# Patient Record
Sex: MALE
[De-identification: names, ages, dates, MRNs, and addresses within clinical notes are randomized per-mention and may not be internally consistent; named-entity substitution may affect disease eponyms.]

---

## 2022-08-28 ENCOUNTER — HOSPITAL ENCOUNTER (INPATIENT)
Dept: HOSPITAL 97 - ER | Age: 67
LOS: 6 days | Discharge: NURSING, REHAB OR LONG-TERM CARE | DRG: 65 | End: 2022-09-03
Attending: INTERNAL MEDICINE | Admitting: INTERNAL MEDICINE
Payer: COMMERCIAL

## 2022-08-28 VITALS — BODY MASS INDEX: 26.6 KG/M2

## 2022-08-28 DIAGNOSIS — I63.232: Primary | ICD-10-CM

## 2022-08-28 DIAGNOSIS — N17.9: ICD-10-CM

## 2022-08-28 DIAGNOSIS — F17.210: ICD-10-CM

## 2022-08-28 DIAGNOSIS — Z79.02: ICD-10-CM

## 2022-08-28 DIAGNOSIS — Z79.82: ICD-10-CM

## 2022-08-28 DIAGNOSIS — R29.704: ICD-10-CM

## 2022-08-28 DIAGNOSIS — D69.6: ICD-10-CM

## 2022-08-28 DIAGNOSIS — Z86.73: ICD-10-CM

## 2022-08-28 DIAGNOSIS — I12.9: ICD-10-CM

## 2022-08-28 DIAGNOSIS — Z79.84: ICD-10-CM

## 2022-08-28 DIAGNOSIS — Z20.822: ICD-10-CM

## 2022-08-28 DIAGNOSIS — E78.5: ICD-10-CM

## 2022-08-28 DIAGNOSIS — N50.9: ICD-10-CM

## 2022-08-28 DIAGNOSIS — Z79.899: ICD-10-CM

## 2022-08-28 DIAGNOSIS — R31.29: ICD-10-CM

## 2022-08-28 DIAGNOSIS — Z23: ICD-10-CM

## 2022-08-28 DIAGNOSIS — C62.11: ICD-10-CM

## 2022-08-28 DIAGNOSIS — G81.94: ICD-10-CM

## 2022-08-28 DIAGNOSIS — N18.30: ICD-10-CM

## 2022-08-28 DIAGNOSIS — E11.22: ICD-10-CM

## 2022-08-28 DIAGNOSIS — I16.0: ICD-10-CM

## 2022-08-28 LAB
BUN BLD-MCNC: 37 MG/DL (ref 7–18)
GLUCOSE SERPLBLD-MCNC: 137 MG/DL (ref 74–106)
HCT VFR BLD CALC: 37.2 % (ref 39.6–49)
INR BLD: 1.07
LYMPHOCYTES # SPEC AUTO: 2.1 K/UL (ref 0.7–4.9)
MAGNESIUM SERPL-MCNC: 2 MG/DL (ref 1.8–2.4)
MCV RBC: 83.1 FL (ref 80–100)
PMV BLD: 8 FL (ref 7.6–11.3)
POTASSIUM SERPL-SCNC: 4.9 MMOL/L (ref 3.5–5.1)
RBC # BLD: 4.47 M/UL (ref 4.33–5.43)

## 2022-08-28 PROCEDURE — 80061 LIPID PANEL: CPT

## 2022-08-28 PROCEDURE — 97165 OT EVAL LOW COMPLEX 30 MIN: CPT

## 2022-08-28 PROCEDURE — 87811 SARS-COV-2 COVID19 W/OPTIC: CPT

## 2022-08-28 PROCEDURE — 83735 ASSAY OF MAGNESIUM: CPT

## 2022-08-28 PROCEDURE — 71045 X-RAY EXAM CHEST 1 VIEW: CPT

## 2022-08-28 PROCEDURE — 87070 CULTURE OTHR SPECIMN AEROBIC: CPT

## 2022-08-28 PROCEDURE — 97530 THERAPEUTIC ACTIVITIES: CPT

## 2022-08-28 PROCEDURE — 70544 MR ANGIOGRAPHY HEAD W/O DYE: CPT

## 2022-08-28 PROCEDURE — 86022 PLATELET ANTIBODIES: CPT

## 2022-08-28 PROCEDURE — 84443 ASSAY THYROID STIM HORMONE: CPT

## 2022-08-28 PROCEDURE — 83605 ASSAY OF LACTIC ACID: CPT

## 2022-08-28 PROCEDURE — 96374 THER/PROPH/DIAG INJ IV PUSH: CPT

## 2022-08-28 PROCEDURE — 85610 PROTHROMBIN TIME: CPT

## 2022-08-28 PROCEDURE — 97116 GAIT TRAINING THERAPY: CPT

## 2022-08-28 PROCEDURE — 90471 IMMUNIZATION ADMIN: CPT

## 2022-08-28 PROCEDURE — 84702 CHORIONIC GONADOTROPIN TEST: CPT

## 2022-08-28 PROCEDURE — 97161 PT EVAL LOW COMPLEX 20 MIN: CPT

## 2022-08-28 PROCEDURE — 70553 MRI BRAIN STEM W/O & W/DYE: CPT

## 2022-08-28 PROCEDURE — 93005 ELECTROCARDIOGRAM TRACING: CPT

## 2022-08-28 PROCEDURE — 99284 EMERGENCY DEPT VISIT MOD MDM: CPT

## 2022-08-28 PROCEDURE — 83036 HEMOGLOBIN GLYCOSYLATED A1C: CPT

## 2022-08-28 PROCEDURE — 82553 CREATINE MB FRACTION: CPT

## 2022-08-28 PROCEDURE — 93880 EXTRACRANIAL BILAT STUDY: CPT

## 2022-08-28 PROCEDURE — 72192 CT PELVIS W/O DYE: CPT

## 2022-08-28 PROCEDURE — 80053 COMPREHEN METABOLIC PANEL: CPT

## 2022-08-28 PROCEDURE — 80048 BASIC METABOLIC PNL TOTAL CA: CPT

## 2022-08-28 PROCEDURE — 93306 TTE W/DOPPLER COMPLETE: CPT

## 2022-08-28 PROCEDURE — 82947 ASSAY GLUCOSE BLOOD QUANT: CPT

## 2022-08-28 PROCEDURE — 83615 LACTATE (LD) (LDH) ENZYME: CPT

## 2022-08-28 PROCEDURE — 70549 MR ANGIOGRAPH NECK W/O&W/DYE: CPT

## 2022-08-28 PROCEDURE — 81001 URINALYSIS AUTO W/SCOPE: CPT

## 2022-08-28 PROCEDURE — 90732 PPSV23 VACC 2 YRS+ SUBQ/IM: CPT

## 2022-08-28 PROCEDURE — 87081 CULTURE SCREEN ONLY: CPT

## 2022-08-28 PROCEDURE — 92610 EVALUATE SWALLOWING FUNCTION: CPT

## 2022-08-28 PROCEDURE — 74177 CT ABD & PELVIS W/CONTRAST: CPT

## 2022-08-28 PROCEDURE — 87040 BLOOD CULTURE FOR BACTERIA: CPT

## 2022-08-28 PROCEDURE — 36415 COLL VENOUS BLD VENIPUNCTURE: CPT

## 2022-08-28 PROCEDURE — 70450 CT HEAD/BRAIN W/O DYE: CPT

## 2022-08-28 PROCEDURE — 85730 THROMBOPLASTIN TIME PARTIAL: CPT

## 2022-08-28 PROCEDURE — 93970 EXTREMITY STUDY: CPT

## 2022-08-28 PROCEDURE — 85025 COMPLETE CBC W/AUTO DIFF WBC: CPT

## 2022-08-28 PROCEDURE — 97112 NEUROMUSCULAR REEDUCATION: CPT

## 2022-08-28 PROCEDURE — 82105 ALPHA-FETOPROTEIN SERUM: CPT

## 2022-08-28 RX ADMIN — SODIUM CHLORIDE SCH MLS: 0.9 INJECTION, SOLUTION INTRAVENOUS at 12:00

## 2022-08-28 RX ADMIN — LOSARTAN POTASSIUM AND HYDROCHLOROTHIAZIDE SCH TAB: 50; 12.5 TABLET, FILM COATED ORAL at 12:00

## 2022-08-28 RX ADMIN — SODIUM CHLORIDE SCH MLS: 0.9 INJECTION, SOLUTION INTRAVENOUS at 21:17

## 2022-08-28 NOTE — P.HP
Certification for Inpatient


Patient admitted to: Inpatient


With expected LOS: >2 Midnights


Practitioner: I am a practitioner with admitting privileges, knowledge of 

patient current condition, hospital course, and medical plan of care.


Services: Services provided to patient in accordance with Admission requirements

found in Title 42 Section 412.3 of the Code of Federal Regulations





Patient History


Date of Service: 08/28/22


History of Present Illness: 


Patient is 67 years of age started complaining of weakness of the left arm since

6 PM on August 27 denies any other complaint no other weakness of his ex

tremities came here to the emergency room left arm weakness CT scan below


No acute large vascular territory infarct or intracranial hemorrhage. Small 

areas of white matter hypoattenuation in the right corona radiata and left 

corona radiata/ basal ganglia could reflect age indeterminate lacunar infarcts. 

MRI could confirm.


 





Review of Systems


10-point ROS is otherwise unremarkable





Physical Examination





- Vital Signs


Temperature: 97 F


Blood Pressure: 213/108


Pulse: 116


Respirations: 16


Pulse Ox (%): 98





- Physical Exam


General: Alert, Oriented x3


HEENT: Atraumatic


Neck: Supple


Respiratory: Clear to auscultation bilaterally


Cardiovascular: No edema, Regular rate/rhythm


Gastrointestinal: Normal bowel sounds, Soft and benign


Musculoskeletal: No clubbing, No swelling


Neurological: Normal speech, Other (Patient has weakness of the left arm NIH 

score is 3)





- Studies


Laboratory Data (last 24 hrs)





08/28/22 09:37: PT 11.8, INR 1.07, APTT 35.8


08/28/22 09:37: WBC 9.30, Hgb 12.8 L, Hct 37.2 L, Plt Count 103 L


08/28/22 09:37: Sodium 137, Potassium 4.9, BUN 37 H, Creatinine 1.68 H, Glucose 

137 H, Magnesium 2.0








Assessment and Plan





- Problems (Diagnosis)


(1) Stroke


Current Visit: Yes   Status: Acute   


Plan: 


Patient is 67 years of age admitted with an acute stroke associated with left 

arm weakness there was no other abnormalities detected and to admit to the floor

carotid ultrasound patient got a dose of aspirin add Plavix loading dose of 300 

mg also has chronic renal failure patient is an active smoker chest x-ray clear


No acute large vascular territory infarct or intracranial hemorrhage. Small 

areas of white matter hypoattenuation in the right corona radiata and left 

corona radiata/ basal ganglia could reflect age indeterminate lacunar infarcts. 

MRI could confirm.


 


Qualifiers: 


   CVA mechanism: unspecified   Qualified Code(s): I63.9 - Cerebral infarction, 

unspecified   





(2) Hypertension


Current Visit: Yes   Status: Acute   


Plan: 


Blood pressure elevated on admission started on losartan hydrochlorothiazide


Qualifiers: 


   Hypertension type: primary hypertension   Qualified Code(s): I10 - Essential 

(primary) hypertension   





- Advance Directives


Does patient have a Living Will: No


Does patient have a Durable POA for Healthcare: No

## 2022-08-28 NOTE — RAD REPORT
EXAM DESCRIPTION:  CT - Ct Stroke Brain Wo Cont - 8/28/2022 9:36 am

 

CLINICAL HISTORY:  Neuro deficit, acute, stroke suspected

 

COMPARISON:  No comparisons

 

TECHNIQUE:  All CT scans are performed using dose optimization technique as appropriate and may inclu
de automated exposure control or mA/KV adjustment according to patient size.

 

FINDINGS:  No intracranial hemorrhage, hydrocephalus or extra-axial fluid collection. Left corona rad
iata/basal ganglia and right corona radiata hypoattenuating foci. No large vascular territory infarct
.

 

The paranasal sinuses and mastoids are clear. The calvarium is intact.

 

IMPRESSION:  No acute large vascular territory infarct or intracranial hemorrhage. Small areas of whi
te matter hypoattenuation in the right corona radiata and left corona radiata/ basal ganglia could re
flect age indeterminate lacunar infarcts. MRI could confirm.

 

Discussed with Dr. Tillman by Dr. Ruiz at 0940 on 8/28/22

## 2022-08-28 NOTE — EDPHYS
Physician Documentation                                                                           

 [facility]                                                                 

Name: Alden Jimenez                                                                                

Age: 67 yrs                                                                                       

Sex: Male                                                                                         

: 1955                                                                                   

MRN: [id number]                                                                                   

Arrival Date: 2022                                                                          

Time: :29                                                                                       

Account#: [id number]                                                                            

Bed 19                                                                                            

Private MD:                                                                                       

ED Physician Phillip Tillman                                                                         

HPI:                                                                                              

                                                                                             

09:49 This 67 yrs old  Male presents to ER via Wheelchair with complaints of S/S of   rn  

      Possible Stroke.                                                                            

09:49 The patient's problem is reported as weakness, in the left upper extremity, in the left rn  

      lower extremity. Onset: The symptoms/episode began/occurred yesterday. Duration: This       

      was a single incident. The symptoms are alleviated by nothing. The symptoms are             

      aggravated by nothing. Associated signs and symptoms: Pertinent positives: weakness,        

      Pertinent negatives: abdominal pain, chest pain, confusion, headache, seizure,              

      shortness of breath. Severity of symptoms: At their worst the symptoms were moderate in     

      the emergency department the symptoms are unchanged. The patient has not experienced        

      similar symptoms in the past. The patient has not recently seen a physician. Pt reports     

      left sided weakness that began yesterday at 6PM, no change since then, no head injury,      

      no previous stroke. No chest pain or sob. No abd pain. Denies numbness/tingling. No         

      speech changes..                                                                            

                                                                                                  

Historical:                                                                                       

- Allergies:                                                                                      

09:43 No Known Allergies;                                                                     mb8 

- PMHx:                                                                                           

09:43 Diabetes mellitus;                                                                      mb8 

                                                                                                  

- Social history:: Smoking status: Patient reports the use of cigarette tobacco                   

  products.                                                                                       

- Family history:: not pertinent.                                                                 

- Hospitalizations: : No recent hospitalization is reported.                                      

                                                                                                  

                                                                                                  

ROS:                                                                                              

09:49 Constitutional: Negative for fever, chills, and weight loss, Eyes: Negative for injury, rn  

      pain, redness, and discharge, Neck: Negative for injury, pain, and swelling,                

      Cardiovascular: Negative for chest pain, palpitations, and edema, Respiratory: Negative     

      for shortness of breath, cough, wheezing, and pleuritic chest pain, Abdomen/GI:             

      Negative for abdominal pain, nausea, vomiting, diarrhea, and constipation, Back:            

      Negative for injury and pain, MS/Extremity: Negative for injury and deformity, Skin:        

      Negative for injury, rash, and discoloration, Neuro: Negative for headache, numbness,       

      tingling, and seizure.                                                                      

                                                                                                  

Exam:                                                                                             

09:49 Constitutional:  This is a well developed, well nourished patient who is awake, alert,  rn  

      and in no acute distress. Head/Face:  Normocephalic, atraumatic. Cardiovascular:            

      Regular rate and rhythm.  No pulse deficits. Respiratory:  No increased work of             

      breathing, no retractions or nasal flaring. Abdomen/GI:  Soft, non-tender Male :  +       

      large right inguinal hernia that extends to scrotum, non tender, no skin changes.           

      Skin:  Warm, dry MS/ Extremity:  Pulses equal, no cyanosis. Neuro:  Awake and alert,        

      GCS 15, oriented to person, place, time, and situation.  Cranial nerves II-XII grossly      

      intact.  + LUE and LLE weakness, LUE strength 3+/5, barely able to lift arm off bed,        

      LLE strength 4/5.  Sensory grossly intact.  Cerebellar exam normal.                         

10:02 ECG was reviewed by the Attending Physician.                                            rn  

18:10 Radiologist reports: No acute findings                                                  rn  

                                                                                                  

Vital Signs:                                                                                      

09:35 Pulse 83; Resp 16; Pulse Ox 100% on R/A;                                                ss  

09:35  / 108;                                                                           ss  

09:44  / 90 LA (auto/reg); Pulse 81; Resp 18 S; Temp 97.7(O); Pulse Ox 98% on R/A; Pain mb8 

      0/10;                                                                                       

09:50  / 88; Pulse 71; Resp 14; Pulse Ox 99% ; Pain 0/10;                               mb8 

10:05  / 91; Pulse 73; Resp 16; Pulse Ox 96% ; Pain 0/10;                               mb8 

10:15  / 84 LA (auto/); Pulse 65; Resp 18 S; Pulse Ox 99% on R/A; Pain 0/10;            mb8 

10:35  / 84; Pulse 69; Resp 20; Pulse Ox 98% ; Pain 0/10;                               mb8 

                                                                                                  

NIH Stroke Scale Scores:                                                                          

09:45 NIHSS Score: 4                                                                          mb8 

09:49 NIHSS Score: 3                                                                          rn  

                                                                                                  

MDM:                                                                                              

09:29 Patient medically screened.                                                             rn  

09:55 Differential diagnosis: CVA, TIA, metabolic disorder. Data reviewed: vital signs,       rn  

      nurses notes, lab test result(s), radiologic studies, CT scan, and as a result, I will      

      admit patient. Counseling: I had a detailed discussion with the patient and/or guardian     

      regarding: the historical points, exam findings, and any diagnostic results supporting      

      the discharge/admit diagnosis, lab results, radiology results, the need for further         

      work-up and treatment in the hospital. Admission orders: after a detailed discussion of     

      the patient's condition and case, the admit orders are written by me.                       

                                                                                                  

                                                                                             

09:31 Order name: Basic Metabolic Panel; Complete Time: 10:18                                 rn  

                                                                                             

09:31 Order name: CBC with Diff; Complete Time: 10:04                                         rn  

                                                                                             

09:31 Order name: Magnesium; Complete Time: 10:18                                             rn  

                                                                                             

09:31 Order name: Protime (+inr); Complete Time: 10:04                                        rn  

                                                                                             

09:31 Order name: Ptt, Activated; Complete Time: 10:04                                        rn  

                                                                                             

09:40 Order name: SARS RAPID; Complete Time: 10:20                                            mb 

                                                                                             

09:31 Order name: CT Stroke Brain w/o Contrast; Complete Time: 10:04                          rn  

                                                                                             

09:47 Order name: Glucose, Ancillary Testing; Complete Time: 10:04                            EDMS

                                                                                             

11:29 Order name: CKMB Creatine Kinase MB                                                     EDMS

                                                                                             

11:29 Order name: CKMB Creatine Kinase MB                                                     EDMS

                                                                                             

11:29 Order name: Lipid Profile                                                               EDMS

                                                                                             

11:29 Order name: Lipid Profile                                                               [facility]

                                                                                             

11:38 Order name: Basic Metabolic Panel                                                       [facility]

                                                                                             

12:44 Order name: Glucose, Ancillary Testing                                                  [facility]

                                                                                             

09:31 Order name: Stroke CXR 1 View                                                           rn  

                                                                                             

09:31 Order name: EKG; Complete Time: 09:32                                                   rn  

                                                                                             

09:31 Order name: Accucheck; Complete Time: 09:40                                             rn  

                                                                                             

09:31 Order name: Cardiac monitoring; Complete Time: 09:40                                    rn  

                                                                                             

09:31 Order name: EKG - Nurse/Tech; Complete Time: 09:40                                      rn  

                                                                                             

09:31 Order name: IV Saline Lock; Complete Time: 09:40                                        rn  

                                                                                             

09:31 Order name: Labs collected and sent; Complete Time: 09:40                               rn  

                                                                                             

09:31 Order name: NPO; Complete Time: 09:40                                                   rn  

                                                                                             

09:31 Order name: O2 Per Protocol; Complete Time: 09:41                                       rn  

                                                                                             

09:43 Order name: CT Head Angio                                                               rn  

                                                                                             

11:29 Order name: NPO                                                                         EDMS

                                                                                             

11:29 Order name: Carotid Artery Bilateral                                                    EDMS

                                                                                             

11:37 Order name: Speech Therapy Consult                                                      [facility]

                                                                                             

09:31 Order name: O2 Sat Monitoring; Complete Time: 09:41                                     rn  

                                                                                             

09:31 Order name: Stroke Swallow Screen; Complete Time: 10:07                                 rn  

                                                                                                  

ECG:                                                                                              

10:02 Rate is 76 beats/min. Rhythm is regular. QRS Axis is Normal. PA interval is normal. QRS rn  

      interval is normal. QT interval is normal. No Q waves. T waves are Normal. No ST            

      changes noted. Clinical impression: NSR w/ Non-specific ST/T Changes. Interpreted by        

      me. Reviewed by me.                                                                         

                                                                                                  

Administered Medications:                                                                         

10:13 Drug: Aspirin 325 mg Route: PO;                                                         mb8 

10:31 Follow up: Response: No adverse reaction                                                mb8 

10:13 Drug: foLIC Acid 1 mg Route: IVPB; Site: right antecubital;                             mb8 

10:15 Follow up: IV Status: Completed infusion                                                mb8 

                                                                                                  

                                                                                                  

Point of Care Testing:                                                                            

      Blood Glucose:                                                                              

09:49 Blood Glucose: 121 mg/dL;                                                               mb8 

      Ranges:                                                                                     

      Critical Glucose Levels:Adult <50 mg/dl or >400 mg/dl  <40 mg/dl or >180 mg/dl       

Disposition Summary:                                                                              

22 09:56                                                                                    

Hospitalization Ordered                                                                           

      Hospitalization Status: Inpatient Admission                                             rn  

      Provider: Damaso Morales                                                              rn  

      [facility]                                                 rn  

      Condition: Stable                                                                       rn  

      Problem: new                                                                            rn  

      Symptoms: are unchanged                                                                 rn  

      Bed/Room Type: Standard                                                                 rn  

      Room Assignment: 419(22 11:55)                                                    dw  

      Diagnosis                                                                                   

        - Cerebral infarction, unspecified                                                    rn  

        - Weakness                                                                            rn  

      Forms:                                                                                      

        - Medication Reconciliation Form                                                      rn  

        - SBAR form                                                                           rn  

                                                                                                  

NIH Stroke Scale - NIH Stroke Score                                                               

Date: 2022                                                                                  

Time: 09:45                                                                                       

Total Score = 4                                                                                   

  1a. Level of Consciousness (LOC) - 0(Alert)                                                     

  1b. Level of Consciousness (LOC) (Month \T\ Age) - 0(Both)                                      

  1c. LOC Commands (Open \T\ Closes Eyes/) - 0(Both)                                          

   2. Best Gaze (Lateral Gaze Paresis) - 0(Normal)                                                

   3. Visual Field Loss - 0(No visual loss)                                                       

   4. Facial Palsy - 0(Normal)                                                                    

  5a. Left Arm: Motor (10-second hold) - 1(Drift)                                                 

  5b. Right Arm: Motor (10-second hold) - 0(No drift)                                             

  6a. Left Leg: Motor (5-second hold - always test supine) - 1(Drift)                             

  6b. Right Leg: Motor (5-second hold - always test supine) - 0(No drift)                         

   7. Limb Ataxia (finger/nose \T\ heel/shin - test with eyes open) - 2(Present in two            

      limbs)                                                                                      

   8. Sensory Loss (pinprick arms/legs/face) - 0(Normal)                                          

   9. Best Language: Aphasia (description/naming/reading) - 0(No aphasia)                         

  10. Dysarthria (speech clarity - read or repeat words) - 0(Normal)                              

  11. Extinction and Inattention (visual/tactile/auditory/spatial/personal) - 0(No                

      abnormality)                                                                                

Initials: mb8                                                                                     

                                                                                                  

                                                                                                  

NIH Stroke Scale - NIH Stroke Score                                                               

Date: 2022                                                                                  

Time: 09:49                                                                                       

Total Score = 3                                                                                   

  1a. Level of Consciousness (LOC) - 0(Alert)                                                     

  1b. Level of Consciousness (LOC) (Month \T\ Age) - 0(Both)                                      

  1c. LOC Commands (Open \T\ Closes Eyes/) - 0(Both)                                          

   2. Best Gaze (Lateral Gaze Paresis) - 0(Normal)                                                

   3. Visual Field Loss - 0(No visual loss)                                                       

   4. Facial Palsy - 0(Normal)                                                                    

  5a. Left Arm: Motor (10-second hold) - 2(Drift, some effort against gravity)                    

  5b. Right Arm: Motor (10-second hold) - 0(No drift)                                             

  6a. Left Leg: Motor (5-second hold - always test supine) - 1(Drift)                             

  6b. Right Leg: Motor (5-second hold - always test supine) - 0(No drift)                         

   7. Limb Ataxia (finger/nose \T\ heel/shin - test with eyes open) - 0(Absent)                   

   8. Sensory Loss (pinprick arms/legs/face) - 0(Normal)                                          

   9. Best Language: Aphasia (description/naming/reading) - 0(No aphasia)                         

  10. Dysarthria (speech clarity - read or repeat words) - 0(Normal)                              

  11. Extinction and Inattention (visual/tactile/auditory/spatial/personal) - 0(No                

      abnormality)                                                                                

Initials: rn                                                                                      

                                                                                                  

Signatures:                                                                                       

Dispatcher [facility]                           EDMS                                                 

Woody, Caterina, Phillip Rios RN, MD MD rn Bates, Michael, RN                      RN   mb8                                                  

                                                                                                  

Corrections: (The following items were deleted from the chart)                                    

10:20 09:47 Head angio ordered. EDMS                                                  EDMS        

11:55 09:56 rn                                                                        rosalia          

17:59 09:49 Constitutional: This is a well developed, well nourished patient who is   rn          

      awake, alert, and in no acute distress. Head/Face: Normocephalic, atraumatic.               

      Cardiovascular: Regular rate and rhythm. No pulse deficits. Respiratory: No                 

      increased work of breathing, no retractions or nasal flaring. Abdomen/GI: Soft,             

      non-tender Skin: Warm, dry MS/ Extremity: Pulses equal, no cyanosis. Neuro:                 

      Awake and alert, GCS 15, oriented to person, place, time, and situation.                    

      Cranial nerves II-XII grossly intact. + LUE and LLE weakness, LUE strength                  

      3+/5, barely able to lift arm off bed, LLE strength 4/5. Sensory grossly                    

      intact. Cerebellar exam normal. rn                                                          

                                                                                                  

**************************************************************************************************

## 2022-08-28 NOTE — RAD REPORT
EXAM DESCRIPTION:   - CP - 8/28/2022 12:54 pm

 

CLINICAL HISTORY:  Stroke

 

COMPARISON:  <Comparisons>

 

TECHNIQUE:  Real-time sonographic evaluation of both carotid systems was performed. Doppler interroga
tion was performed with waveform tracing bilaterally.

 

FINDINGS:  Normal high resistance waveforms are noted in both external carotid arteries. The common c
arotid arteries show normal low resistance waveforms. Elevated waveform noted at the left proximal IC
A. Normal waveforms in the right ICA.

 

Hard plaque present at both carotid bifurcations and carotid bulbs. Elevated peak systolic velocities
 are present at the left ICA with peak systolic velocity of 166 cm/second at the midportion. The prox
imal left ICA has a peak systolic velocity of 139 cm/second. The carotid system has normal waveforms 
and peak systolic velocities.

 

Antegrade flow seen in both vertebral arteries.

 

IMPRESSION:  Moderate (50-69%) stenosis at the left ICA. The right carotid system is patent. The vert
ebral arteries are patent.

## 2022-08-28 NOTE — RAD REPORT
EXAM DESCRIPTION:  RAD - Chest Single View - 8/28/2022 10:35 am

 

CLINICAL HISTORY:  left sided weakness

 

COMPARISON:  <Comparisons>

 

FINDINGS:  Lines: None.

Lungs: No evidence of edema or pneumonia.

Pleural: No significant pleural effusions or pneumothorax.

Cardiac: The heart size is within normal limits.

Bones: No acute fractures.

Other:

 

IMPRESSION:  No acute cardiopulmonary disease.

## 2022-08-28 NOTE — ER
Nurse's Notes                                                                                     

 [facility]                                                                 

Name: Alden Jimenez                                                                                

Age: 67 yrs                                                                                       

Sex: Male                                                                                         

: 1955                                                                                   

MRN: [id number]                                                                                   

Arrival Date: 2022                                                                          

Time: 09:29                                                                                       

Account#: [id number]                                                                            

Bed 19                                                                                            

Private MD:                                                                                       

Diagnosis: Cerebral infarction, unspecified;Weakness                                              

                                                                                                  

Presentation:                                                                                     

                                                                                             

09:24 Chief complaint: Patient states: L sided weakness that began at 1800 yesterday evening. ss  

      Coronavirus screen: Client denies travel out of the [place] in the last 14 days. Ebola         

      Screen: Patient denies exposure to infectious person. Patient denies travel to an           

      Ebola-affected area in the 21 days before illness onset. An acute neurological deficit      

      is present. Pre-hospital glucose is not applicable to this patient. Initial Sepsis          

      Screen: Does the patient meet any 2 criteria? No. Patient's initial sepsis screen is        

      negative. Does the patient have a suspected source of infection? No. Patient's initial      

      sepsis screen is negative. Risk Assessment: Do you want to hurt yourself or someone         

      else? Patient reports no desire to harm self or others.                                     

09:24 Method Of Arrival: Wheelchair                                                           ss  

09:35 Onset of symptoms was 2022 at 18:00.                                         ss  

09:35 Acuity: JHOAN 2                                                                           ss  

                                                                                                  

Triage Assessment:                                                                                

09:43 The onset of the patients symptoms was more than six hours ago. The onset of the        mb8 

      patients symptoms was 2022 at 18:00. General: Appears uncomfortable,             

      Behavior is calm, cooperative, appropriate for age. Pain: Denies pain. Neuro: Reports       

      numbness in left arm and left leg.                                                          

                                                                                                  

Stroke Activation: Symptom onset > 6 hours                                                        

 Physician: Stroke Attending; Name: ; Notified At: ; Arrived At:                                  

 Physician: Chief Stroke Resident; Name: ; Notified At: ; Arrived At:                             

 Physician: Stroke Resident; Name: ; Notified At: ; Arrived At:                                   

 Physician: ED Attending; Name: ; Notified At: ; Arrived At:                                      

 Physician: ED Resident; Name: ; Notified At: ; Arrived At:                                       

09:24 Code stroke called at 0924. L sided weakness began < 24 hours ago. Acute neuro deficit  ss  

      noted. Began at 1800 yesterday evening.                                                     

                                                                                                  

Historical:                                                                                       

- Allergies:                                                                                      

:43 No Known Allergies;                                                                     mb8 

- PMHx:                                                                                           

09:43 Diabetes mellitus;                                                                      mb8 

                                                                                                  

- Social history:: Smoking status: Patient reports the use of cigarette tobacco                   

  products.                                                                                       

- Family history:: not pertinent.                                                                 

- Hospitalizations: : No recent hospitalization is reported.                                      

                                                                                                  

                                                                                                  

Screenin:47 Abuse screen: Denies threats or abuse. Denies injuries from another. Nutritional        mb8 

      screening: No deficits noted. Tuberculosis screening: No symptoms or risk factors           

      identified. Fall Risk No fall in past 12 months (0 pts). Secondary diagnosis (15            

      points) IV access (20 points). Ambulatory Aid- None/Bed Rest/Nurse Assist (0 pts).          

      Gait- Weak (10 pts.). Mental Status- Oriented to own ability (0 pts). Total Scott Fall      

      Scale indicates Low Risk Score (25-44 pts). Fall prevention measures have been              

      instituted. Side Rails Up X 2 Family Present and informed to notify staff if they need      

      to leave bedside As available Patient and Family Educated on Fall Prevention Program        

      and strategies.                                                                             

09:51 Patient has been NPO before screening. The patient is alert, able to follow commands.   mb8 

      The patient does not exhibit slurred or garbled speech The patient is not exhibiting        

      difficulty speaking. The patient does not exhibit difficulty understanding words. The       

      patient is able to swallow own secretions with no drooling or need for suction. Patient     

      tolerated one teaspoon of water. No drooling, immediate coughing, gurgling, or clearing     

      of the throat was noted. The patient tolerated 90mL of water. No drooling, immediate        

      coughing, gurgling, or clearing of the throat was noted. The patient passed the bedside     

      swallow screening. Oral medications may be given as ordered. Contact Physician for          

      further diet orders. Provider notified of bedside swallow screening results: Phillip Tillman MD.                                                                                   

                                                                                                  

Assessment:                                                                                       

09:45 VAN Scoring: Arm Drift: Minor drift Visual Disturbance: No visual disturbance noted.    mb8 

      Aphasia: No aphasia noted. Neglect: No neglect noted. TNKase (Tenecteplase) Screening:      

      Contraindications: Patient reports onset of signs and symptoms of stroke greater than 6     

      hours ago: Yes.                                                                             

09:47 Cardiovascular: Denies chest pain, shortness of breath, Rhythm is sinus rhythm.         mb8 

09:47 : enlarged scrotum.                                                                   mb8 

10:03 Patient has been NPO before screening. The patient is alert, and able to follow         mb8 

      commands. The patient does not exhibit slurred or garbled speech. The patient is not        

      exhibiting difficulty speaking. The patient is exhibiting difficulty understanding          

      words. The patient is able to swallow own secretions with no drooling or need for           

      suction. Patient tolerated one teaspoon of water. No drooling, immediate coughing,          

      gurgling, or clearing of the throat was noted. The patient tolerated 90mL of water. No      

      drooling, immediate coughing, gurgling, or clearing of the throat was noted. The            

      patient passed the bedside swallow screening. Oral medications may be given as ordered.     

      Contact Physician for further diet orders. Provider notified of bedside swallow             

      screening results: Phillip Tillman MD.                                                          

10:13 Reassessment: No changes from previously documented assessment. Patient and/or family   mb8 

      updated on plan of care and expected duration. Pain level reassessed. Patient is alert,     

      oriented x 3, equal unlabored respirations, skin warm/dry/pink. Patient denies pain at      

      this time.                                                                                  

                                                                                                  

Vital Signs:                                                                                      

09:35 Pulse 83; Resp 16; Pulse Ox 100% on R/A;                                                ss  

09:35  / 108;                                                                           ss  

09:44  / 90 LA (auto/reg); Pulse 81; Resp 18 S; Temp 97.7(O); Pulse Ox 98% on R/A; Pain mb8 

      0/10;                                                                                       

09:50  / 88; Pulse 71; Resp 14; Pulse Ox 99% ; Pain 0/10;                               mb8 

10:05  / 91; Pulse 73; Resp 16; Pulse Ox 96% ; Pain 0/10;                               mb8 

10:15  / 84 LA (auto/); Pulse 65; Resp 18 S; Pulse Ox 99% on R/A; Pain 0/10;            mb8 

10:35  / 84; Pulse 69; Resp 20; Pulse Ox 98% ; Pain 0/10;                               mb8 

                                                                                                  

NIH Stroke Scale Scores:                                                                          

09:45 NIHSS Score: 4                                                                          mb8 

09:49 NIHSS Score: 3                                                                          rn  

                                                                                                  

ED Course:                                                                                        

09:29 Patient arrived in ED.                                                                  eb  

09:29 Phillip Tillman MD is Attending Physician.                                                rn  

09:35 Arm band placed on left wrist.                                                          ss  

09:37 CT Stroke Brain w/o Contrast In Process Unspecified.                                    EDMS

09:38 Initial lab(s) drawn, by me, sent to lab. EKG done. Inserted saline lock: 18 gauge in   mb8 

      right antecubital area, using aseptic technique. Blood collected.                           

09:39 Triage completed.                                                                       ss  

09:45 Client placed on continuous cardiac and pulse oximetry monitoring. NIBP monitoring      mb8 

      applied. Cardiac monitor on.                                                                

09:48 Patient has correct armband on for positive identification.                             mb8 

09:48 No provider procedures requiring assistance completed.                                  mb8 

09:49 Moises Mederos, RN is Primary Nurse.                                                    mb8 

09:56 Damaso Morales MD is Hospitalizing Provider.                                         rn  

10:03 SARS RAPID Sent.                                                                        mb8 

10:30 X-ray(s) taken.                                                                         mb8 

10:36 Stroke CXR 1 View Sent.                                                                 mb8 

10:37 Stroke CXR 1 View In Process Unspecified.                                               EDMS

                                                                                                  

Administered Medications:                                                                         

10:13 Drug: Aspirin 325 mg Route: PO;                                                         mb8 

10:31 Follow up: Response: No adverse reaction                                                mb8 

10:13 Drug: foLIC Acid 1 mg Route: IVPB; Site: right antecubital;                             mb8 

10:15 Follow up: IV Status: Completed infusion                                                mb8 

                                                                                                  

                                                                                                  

Medication:                                                                                       

09:47 VIS not applicable for this client.                                                     mb8 

                                                                                                  

Point of Care Testing:                                                                            

      Blood Glucose:                                                                              

09:49 Blood Glucose: 121 mg/dL;                                                               mb8 

      Ranges:                                                                                     

                                                                                                  

Outcome:                                                                                          

09:56 Decision to Hospitalize by Provider.                                                    rn  

13:38 Patient left the ED.                                                                    mb8 

                                                                                                  

                                                                                                  

NIH Stroke Scale - NIH Stroke Score                                                               

Date: 2022                                                                                  

Time: 09:45                                                                                       

Total Score = 4                                                                                   

  1a. Level of Consciousness (LOC) - 0(Alert)                                                     

  1b. Level of Consciousness (LOC) (Month \T\ Age) - 0(Both)                                      

  1c. LOC Commands (Open \T\ Closes Eyes/) - 0(Both)                                          

   2. Best Gaze (Lateral Gaze Paresis) - 0(Normal)                                                

   3. Visual Field Loss - 0(No visual loss)                                                       

   4. Facial Palsy - 0(Normal)                                                                    

  5a. Left Arm: Motor (10-second hold) - 1(Drift)                                                 

  5b. Right Arm: Motor (10-second hold) - 0(No drift)                                             

  6a. Left Leg: Motor (5-second hold - always test supine) - 1(Drift)                             

  6b. Right Leg: Motor (5-second hold - always test supine) - 0(No drift)                         

   7. Limb Ataxia (finger/nose \T\ heel/shin - test with eyes open) - 2(Present in two            

      limbs)                                                                                      

   8. Sensory Loss (pinprick arms/legs/face) - 0(Normal)                                          

   9. Best Language: Aphasia (description/naming/reading) - 0(No aphasia)                         

  10. Dysarthria (speech clarity - read or repeat words) - 0(Normal)                              

  11. Extinction and Inattention (visual/tactile/auditory/spatial/personal) - 0(No                

      abnormality)                                                                                

Initials: mb8                                                                                     

                                                                                                  

                                                                                                  

NIH Stroke Scale - NIH Stroke Score                                                               

Date: 2022                                                                                  

Time: 09:49                                                                                       

Total Score = 3                                                                                   

  1a. Level of Consciousness (LOC) - 0(Alert)                                                     

  1b. Level of Consciousness (LOC) (Month \T\ Age) - 0(Both)                                      

  1c. LOC Commands (Open \T\ Closes Eyes/) - 0(Both)                                          

   2. Best Gaze (Lateral Gaze Paresis) - 0(Normal)                                                

   3. Visual Field Loss - 0(No visual loss)                                                       

   4. Facial Palsy - 0(Normal)                                                                    

  5a. Left Arm: Motor (10-second hold) - 2(Drift, some effort against gravity)                    

  5b. Right Arm: Motor (10-second hold) - 0(No drift)                                             

  6a. Left Leg: Motor (5-second hold - always test supine) - 1(Drift)                             

  6b. Right Leg: Motor (5-second hold - always test supine) - 0(No drift)                         

   7. Limb Ataxia (finger/nose \T\ heel/shin - test with eyes open) - 0(Absent)                   

   8. Sensory Loss (pinprick arms/legs/face) - 0(Normal)                                          

   9. Best Language: Aphasia (description/naming/reading) - 0(No aphasia)                         

  10. Dysarthria (speech clarity - read or repeat words) - 0(Normal)                              

  11. Extinction and Inattention (visual/tactile/auditory/spatial/personal) - 0(No                

      abnormality)                                                                                

Initials: rn                                                                                      

                                                                                                  

Signatures:                                                                                       

Dispatcher [employer]                           Phillip Bhatia MD MD rn Smirch, Shelby, RN RN ss Botello, Elizabeth eb Bates, Michael, RN                      RN   mb8                                                  

                                                                                                  

Corrections: (The following items were deleted from the chart)                                    

10:05 10:03  / 88; Pulse 71bpm; Resp 14bpm; Pulse Ox 99%; Pain 0/10; mb8        mb8         

                                                                                                  

**************************************************************************************************

## 2022-08-28 NOTE — RAD REPORT
EXAM DESCRIPTION:  CT - Pelvis Wo Cont - 8/28/2022 4:59 pm

 

CLINICAL HISTORY:  scrotal swelling

 

COMPARISON:  Chest Single View dated 8/28/2022

 

FINDINGS:  Mass only enlarged right scrotum secondary to a fat containing mass measuring 22.3 x 16.3 
cm. A normal right testicle is not identified. The left testicle is seemingly normal. No lymphadenopa
thy is identified. No bowel obstruction is identified. No fractures or osseous lesions.

 

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 No lymph
adenopathy is identified.

## 2022-08-29 LAB
BUN BLD-MCNC: 39 MG/DL (ref 7–18)
CKMB CREATINE KINASE MB: 1.6 NG/ML (ref 1–3.6)
GLUCOSE SERPLBLD-MCNC: 132 MG/DL (ref 74–106)
HDLC SERPL-MCNC: 53 MG/DL (ref 40–60)
LDLC SERPL CALC-MCNC: 62 MG/DL (ref ?–130)
POTASSIUM SERPL-SCNC: 4.5 MMOL/L (ref 3.5–5.1)

## 2022-08-29 RX ADMIN — HUMAN INSULIN SCH: 100 INJECTION, SOLUTION SUBCUTANEOUS at 20:58

## 2022-08-29 RX ADMIN — HUMAN INSULIN SCH UNIT: 100 INJECTION, SOLUTION SUBCUTANEOUS at 16:58

## 2022-08-29 RX ADMIN — HUMAN INSULIN SCH: 100 INJECTION, SOLUTION SUBCUTANEOUS at 11:59

## 2022-08-29 RX ADMIN — HYDRALAZINE HYDROCHLORIDE PRN MG: 20 INJECTION INTRAMUSCULAR; INTRAVENOUS at 21:30

## 2022-08-29 RX ADMIN — SODIUM CHLORIDE SCH MLS: 0.9 INJECTION, SOLUTION INTRAVENOUS at 21:25

## 2022-08-29 RX ADMIN — CLOPIDOGREL BISULFATE SCH MG: 75 TABLET, FILM COATED ORAL at 09:12

## 2022-08-29 RX ADMIN — SODIUM CHLORIDE SCH MLS: 0.9 INJECTION, SOLUTION INTRAVENOUS at 12:04

## 2022-08-29 RX ADMIN — ENOXAPARIN SODIUM SCH MG: 40 INJECTION SUBCUTANEOUS at 09:11

## 2022-08-29 RX ADMIN — ATORVASTATIN CALCIUM SCH MG: 10 TABLET, FILM COATED ORAL at 21:25

## 2022-08-29 RX ADMIN — LOSARTAN POTASSIUM AND HYDROCHLOROTHIAZIDE SCH TAB: 50; 12.5 TABLET, FILM COATED ORAL at 09:12

## 2022-08-29 RX ADMIN — FOLIC ACID SCH MG: 1 TABLET ORAL at 17:00

## 2022-08-29 RX ADMIN — ASPIRIN 81 MG SCH MG: 81 TABLET ORAL at 09:12

## 2022-08-29 NOTE — EKG
Test Date:    2022-08-28               Test Time:    09:40:10

Technician:   ES                                     

                                                     

MEASUREMENT RESULTS:                                       

Intervals:                                           

Rate:         76                                     

MT:           144                                    

QRSD:         66                                     

QT:           356                                    

QTc:          400                                    

Axis:                                                

P:            36                                     

MT:           144                                    

QRS:          3                                      

T:            -16                                    

                                                     

INTERPRETIVE STATEMENTS:                                       

                                                     

Normal sinus rhythm

Minimal voltage criteria for LVH, may be normal variant

Cannot rule out Anterior infarct, age undetermined

Abnormal ECG

No previous ECG available for comparison



Electronically Signed On 08-29-22 10:20:09 CDT by Seth Yuen

## 2022-08-29 NOTE — RAD REPORT
EXAM DESCRIPTION:  MRI - Brain W/Wo Cont - 8/29/2022 11:59 am

 

CLINICAL HISTORY:  Stroke Eval

 

COMPARISON:  MRA Head Wo Cont dated 8/29/2022

 

TECHNIQUE:  Sagittal T1-weighted images were obtained along with PD/heavily T2-weighted and T2-FLAIR 
images. Axial DWI and ADC mapping sequences were also obtained along with coronal heavily T2-weighted
 images were obtained. Post contrast enhanced images were obtained.

 

FINDINGS:  Small acute infarct in the right corona radiata. Remote left corona radiata infarct. No ma
ss effect or midline shift. No extra-axial fluid collections. Signal voids are seen as a normal findi
ng in the major intracranial vessels. Some scattered nonspecific T2/FLAIR hyperintense signal foci wi
thin the subcortical and deep white matter is nonspecific but may reflect sequela of chronic small ve
ssel ischemic changes. No abnormal enhancement.

 

No mastoid effusion.Paranasal sinuses are clear.

 

IMPRESSION:  Small acute right corona radiata infarct. Small remote left corona radiata infarct. No a
bnormal enhancement.

## 2022-08-29 NOTE — CON
Reason For Consultation:  Consultation called because of stroke.



History Of Present Illness:  Mr. Jimenez is a 67-year-old right-handed  patient with likely u
ntreated and undiagnosed hypertension, comes to [facility] with left upper and lower extremi
ties coordination and weakness.  The patient's time of onset was more than 12 hours from his arrival 
to the hospital.  Symptoms began around 6:00 p.m. in the evening yesterday.  He arrives around 9:00 a
.m. the following day.  His head CT scan revealed no acute vascular infarct.  There were small areas 
of hypoattenuation at right corona radiata and left corona radiata which was noted to represent infar
cts that are lacunar but of uncertain age.  His brain MRI done today did confirm the presence of an a
cute ischemic stroke in the right corona radiata, in addition to a more remote left corona radiata in
farct.  The right corona radiata stroke likely accounted for his new left-sided symptoms.



Past Medical History:  As noted.  Diabetes mellitus, not treated.



Allergies:  NO KNOWN DRUG ALLERGIES.



Social History:  Smokes tobacco cigarettes and drinks alcohol occasionally.  Denies IV drugs.



Family History:  Noncontributory.



Review of Systems:

He denies any recent fevers, chills, nausea, vomiting, myalgias, arthralgias, rash, headache, weight 
change, or psychiatric issues.



Physical Examination:

Vital Signs:  Blood pressure 202/82, pulse 70, respiratory rate 14, temperature 97.5, oxygen saturati
on 100% on room air.  Weight 180 pounds.  Height 5 feet 9 inches.  BMI 26.6. 

General:  Mr. Jimenez is resting in bed.  He is in no significant distress. 

HEENT:  He is normocephalic, atraumatic.  Sclerae anicteric.  Oropharynx is pink and moist. 

Neck:  Supple. 

Chest:  Clear. 

Heart:  Regular. 

Extremities:  Show no edema or cyanosis. 

Neurologic:  Alert and oriented to situation, place, and person.  Cranial nerves show no focal defici
ts.  He does have some subtle weakness in the left upper and lower extremity compared to the right si
de.  His sensory exam intact in the right and left.  Coordination, some dysmetria noted in the left u
pper and lower compared to the right side.  In terms of his gait, I was able to ambulate without an a
ssistive device.  Did not show significant deficits despite the MRI findings.  Please note, he actual
ly does have some significant weakness that appears intermittent when evaluated by the physical thera
pist.  Left shoulder was around 3/5 and left elbow around 2/5, but he appeared to move that more at t
he time of my evaluation.  He has 3 to 4/5.



Laboratory Studies:  Complete blood count with differential is essentially unremarkable.  His creatin
ine elevated to 1.59, glucose ranged from 132-257.  LDL cholesterol 62, HDL 53, total cholesterol 136
.  COVID test is negative.  Carotid artery ultrasound shows 56% stenosis in the left internal carotid
.  Right carotid is patent.  The MRA of the head shows no significant abnormalities of the [place] 
[place].  MRA of the neck shows no limiting stenosis in the neck.



Assessment:  Mr. Jimenez is a 67-year-old patient with untreated and undiagnosed hypertension and adam
betes mellitus, who was not on aspirin or antiplatelet medications, and he has bilateral lacunar infa
rcts in the corona radiata and deficits now more notable on the left side, but he is unaware of when 
he had this stroke on the contralateral side.



Plan:  

1.Aggressive physical and occupational therapy, potentially inpatient if he qualifies.

2.Aggressive management of diabetes mellitus, hypertension.

3.Aspirin 81 mg, Plavix 75 mg, Lipitor 40 mg at bedtime, folate 1 mg daily.





JOHN/RICHARD

DD:  08/29/2022 18:15:24Voice ID:  [id number]

DT:  08/29/2022 22:38:27Report ID:  [id number]

## 2022-08-29 NOTE — RAD REPORT
EXAM DESCRIPTION:  MRI - MRA Neck W/Wo Cont - 8/29/2022 11:59 am

 

CLINICAL HISTORY:  Stroke Eval

 

COMPARISON:  No comparisons

 

FINDINGS:  Contrast enhance 2D time-of-flight MR angiography of the neck vessels was performed.

 

No hemodynamically significant stenosis identified. Mild less than 50% stenosis is present at the lef
t proximal ICA. The external carotid arteries are widely patent. The common carotid arteries are wide
ly patent. Normal three-vessel arch.

 

IMPRESSION:  No flow limiting stenosis is present within the neck.

## 2022-08-29 NOTE — RAD REPORT
EXAM DESCRIPTION:  MRI - MRA Head Wo Cont - 8/29/2022 11:59 am

 

CLINICAL HISTORY:  Stroke Eval

CVA

 

COMPARISON:  Brain W/Wo Cont dated 8/29/2022; Ct Stroke Brain Wo Cont dated 8/28/2022

 

FINDINGS:  3D noncontrast time-of-flight MR angiography of the [place] was performed.

 

No aneurysm, flow-limiting stenosis or vascular malformation is seen. Slightly left dominant vertebra
l artery. Fetal type left PCA.

 

The visualized dural venous sinuses appear patent.

 

IMPRESSION:  No significant flow abnormality of the [place] is identified.

## 2022-08-29 NOTE — CON
Reason For Consultation:  Scrotal swelling.



Additional Consulting Physician:  Dr. Delgadillo.



History Of Present Illness:  Mr. Jimenez is a 67-year-old gentleman, who was 
admitted via the emergency department on 08/28/2022 with complaints of left arm 
weakness that began the day prior.  A CT scan was performed revealing no acute 
large vascular territory of infarct or intracranial hemorrhage, but a subsequent
MRI performed 08/29/2022 revealed the presence of a small acute right corona 
radiata infarct with a remote history of a left corona radiata infarct.  As a 
result, he has been managed via stroke protocol with aspirin as well as Plavix 
and Lovenox, but in the process, the patient has advanced knowledge of a right 
hemiscrotal swelling that began as something small and less than 1 inch in 
diameter, but has now increased to something as large as a small football within
his scrotum.  He claims he was told that this was simply a hernia and has been 
present and enlarging over the last 3 years.  It causes him no pain or 
tenderness and he otherwise had no significant suspicion. 



Scrotal ultrasound was performed along with a CT scan of the pelvis without 
contrast, 08/28/2022 revealing the following findings:  Mass involving the right
hemiscrotum secondary to a fat containing lesion measuring 22.3 x 16.3 cm.  The 
right testis was not identified.  The left testis was normal in appearance.  
There was no lymphadenopathy within the pelvis.  No bowel obstruction was 
identified.  No fractures or osseous lesions were identified. 



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 
tumor or liposarcoma.  No lymphadenopathy in the pelvis.



Physical Examination:

Alert, awake, oriented, in no acute distress.  There was no dyspnea or sign of 
respiratory distress.  His abdomen was soft, nontender, and there were no masses
palpable.  His genitalia:  Phallus retracted and the glans not visualizable 
because of a massively enlarged right hemiscrotum displacing the left testis 
superiorly and laterally, which was palpable and nontender without mass.  No 
distinct palpable right testis was identified, and the entirety of the structure
was nontender.  The mass extended into the space of [place] at the 
inferior border of the right inguinal canal.



Assessment And Recommendations:  A 67-year-old gentleman with hypertension and 
hyperlipidemia admitted due to a small right acute corona radiata CVA with a 3-
year progressively enlarging right hemiscrotal mass consistent with malignancy. 
I counseled the patient that the presence of this mass likely represented a 
cancer and that it would need to be removed.  Because he has recently had a 
stroke and is on multiple platelet inhibitors and other anticoagulants, this 
would need to be held in order to adequately be able to surgically manage this 
lesion.  While it has been present for 3 years and therefore it is subacute in 
nature, it likely should be managed in short order.  As a result, I will consult
with both his admitting hospitalist, Dr. Delgadillo, as well as the neurologist, Dr. Donovan as to the timing for being able to hold his Plavix in preparation for 
surgical therapy.  Meanwhile, I recommended the following:  CT abdomen with IV 
contrast to assess for steven-aortic and other intraabdominal lymphadenopathy or 
masses from metastasis of this likely testicular malignancy versus sarcoma, 
serum beta hCG, LDH, and alpha-fetoprotein in the meantime.  He will require 
subacute surgical intervention for this right hemiscrotal and potentially 
testicular mass.





ALBERTO/RICHARD

DD:  08/29/2022 16:20:50   Voice ID:  [id number]

DT:  08/29/2022 16:40:57   Report ID:  [id number]

MTDWES

## 2022-08-29 NOTE — P.PN
Subjective


Date of Service: 22


Chief Complaint: Acute Left-Sided Weakness


Subjective: No new changes


No acute events overnight. He reports persistent weakness in his left upper and 

left lower extremities. He denies any sensory deficits. He denies any chest 

pain, palpitations, shortness of breath, nausea/vomiting.





Review of Systems


10-point ROS is otherwise unremarkable


General: Weakness


Neurological: Weakness (left-sided)





Physical Examination





- Vital Signs


Temperature: 97.5 F


Blood Pressure: 202/82


Pulse: 70


Respirations: 14


Pulse Ox (%): 100





- Physical Exam


General: Alert, In no apparent distress, Oriented x3


HEENT: Atraumatic, PERRLA, Mucous membr. moist/pink, EOMI, Sclerae nonicteric


Neck: Supple, JVD not distended


Respiratory: Clear to auscultation bilaterally, Normal air movement


Cardiovascular: No edema, Regular rate/rhythm, Normal S1 S2, No gallops, No 

rubs, No murmurs


Gastrointestinal: Normal bowel sounds, Soft and benign, Non-distended, No 

tenderness, No rebound, No guarding


Musculoskeletal: No clubbing


Integumentary: No rashes


Neurological: Normal speech, Sensation intact, Cranial nerves 3-12 intact, 

Normal reflexes 2+, Normal affect, Other (5/5 strength in RUE/RLE. 2/5 strength 

in LUE, LLE.)





Assessment And Plan





- Plan


NIH Stroke Scale


1a. Level of consciousness: 0 - Alert; keenly responsive  


1b. LOC questions: 0 - Both questions right


1c. LOC commands: 0 - Performs both tasks


2. Best Gaze: 0 - Normal


3. Visual: 0 - No visual loss


4. Facial Palsy: 0 - Normal symmetry


5a. Motor left arm: 2 - some effort against gravity


5b. Motor right arm: 0 - No drift for 10 seconds


6a. Motor left le - some effort against gravity


6b. Motor right le - No drift for 5 seconds


7. Limb ataxia: 0 - No ataxia


8. Sensory: 0 - Normal; no sensory loss


9. Best Language: 0 - Normal; no aphasia


10. Dysarthria: 0 - Normal


11. Extinction and Inattention: 0 - No abnormality 


12. Distal motor function: 0 - No abnormality





Total Score: 4





# Acute Small Right Corona Radiata Cerebrovascular Accident


# Chronic Remote Left Corona Radiata Infarct 


# Moderate Left Internal Carotid Artery Stenosis 


- Consulted Neurology - recommendations appreciated 


- NIHSS = 4


- Evaulation thus far:


   - CT head = "no acute large vascular territory infarct or intracranial 

hemorrhage. Small areas of white matter hypoattenuation in the right corona 

radiata and left corona radiata/ basal ganglia could reflect age indeterminate 

lacunar infarcts. MRI could confirm."


   - MRI head = "small acute right corona radiata infarct. Small remote left 

corona radiata infarct. No abnormal enhancement."


   - MRA head = "no significant flow abnormality of the [place] is 

identified."


   - MRA neck = "no flow limiting stenosis is present within the neck."


   - Carotid US = "moderate (50-69%) stenosis at the left ICA. The right carotid

system is patent. The vertebral arteries are patent."


- q4hr neurochecks


- PT/OT evaluation requested 


- Ordered risk profile: Hgb A1c, TSH 


   - Lipid panel: , , LDL 62, HDL 53


- Continue aspirin, atorvastatin, folic acid, clopidogrel





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 

cm)


- CT pelvis = "very large right scrotal mass may be a primary testicular tumor 

or liposarcoma. No lymphadenopathy is identified."


- Urology consulted and spoke with Dr. Barton - recommendations appreciated


   - Recommends CT abdomen/pelvis with contrast to evaluate for metastases - 

ordered


   - Recommends b-HCG, AFP, LDH - ordered





# Elevated Creatinine - Acute Kidney Injury vs Chronic Kidney Disease Stage III


- Creatinine = 1.59 (baseline creatinine unknown) 


- Urinalysis = pending


- Monitor creatinine and urine output 


   - If worsening, obtain renal ultrasound 


- Renally dose medications





# Hypertensive Urgency


- PRN hydralazine


- Continue home meds








Campos Delgadillo M.D.


Discharge Plan: Other (inpatient rehab)


Plan to discharge in: 48 Hours

## 2022-08-29 NOTE — CON
Date of Consultation:  08/29/2022



Brief History Of Present Illness:  The patient is a 67-year-old  male who presents to the [facility] at 08/28 with complaints of weakness of the left arm and possible symptoms of stroke, as such pepe lawson came to the emergency room with the above-stated complaints.  He was admitted and a stroke workup w
as being initiated and continues to be ongoing with concern of a mild stroke without obvious large va
scular territorial infarct or intracranial hemorrhage at this time.  Incidentally, the patient was no
elinor to have a very large distended scrotum, which he states was a hernia as he was told several years
 ago.  He states it has been present for the last 1-2 years and I was asked to see the patient for po
ssible hernia repair.  I saw the patient and recommended a CT of the pelvis, which showed a large val
ticular mass.  As such, there was no evidence of inguinal hernia at this time.  The patient states th
at he only noted that the scrotum tends to get larger.  It was not associated with any obstructive co
mplaints.  He is not aware of any other findings related to his scrotum other than the above stated i
ssues.



Past Medical History:  Included diabetes, possibly hypertension.



Allergies:  NO KNOWN DRUG ALLERGIES.



Social History:  He does have a positive history of smoke and tobacco usage.  Denies any recreational
 drug use.



Review of Systems:

Ten-point review of systems other than HPI, currently denies.  He states he feels somewhat slightly i
mproved from his admission to the hospital.  Continues to have weakness, however.



Physical Examination:

General:  At the time of my examination, he is awake, alert, and oriented. 

Psychiatric:  He is conversive, but has poor insight into his past medical history. 

HEENT:  Otherwise normocephalic.  His sclerae are anicteric.  His mucosa is moist.  His oropharynx is
 clear. 

Neck:  Supple without JVD. 

Chest:  Normal expansion and excursion. 

Abdomen:  Focused examination of the abdomen is soft, nontender, nondistended. 

:  Examination of the scrotal area shows a very large firm scrotum.  It is difficult to palpate any
 specific anatomic structures as there appears to be a mass effect in this area with minimal tenderne
ss to the area.



Laboratory Data:  Revealed a white blood cell count of 9.3, hemoglobin is 12.8, hematocrit 37.2, plat
elet count is 103.  Coags showed a PT 11.8, INR 1.07, PTT 35.8.  Chemistry; 140 was sodium, potassium
 4.5, chloride 108, carbon dioxide 24, BUN 39, creatinine 1.5, glucose 132, CK-MB was 1.6.  Serology 
showed COVID was negative.  He had imaging performed, which included a carotid artery ultrasound on a
dmission on 08/28, officially read as moderate 50-69% stenosis of the left ICA.  The right carotid sy
stem is patent.  The vertebral arteries are patent.  He had a brain CT officially read on 08/28 as no
 acute large vascular territorial infarct or intracranial hemorrhage.  Small areas of white matter hy
poattenuation in the right corona radiata, left corona radiata/basal ganglia.  Could reflect age-inde
terminate lacunar infarct.  MRI could confirm he had a CT of the pelvis as well, performed which was 
officially read on 08/28 as a very large right scrotal mass, may be primary testicular tumor or lipos
arcoma.  No lymphadenopathy is identified.  The mass only enlarged right scrotum secondary to fat con
taining mass seen measuring 22.3 x 16.3 cm and normal right testicle is not identified and left testi
moise is seemingly normal.  No lymphadenopathy is identified.  No bowel obstruction identified.  No fra
ctures or osseous lesions.



Assessment And Plan:  This is a 67-year-old male who comes in with possible stroke.

1.Continue medical management for his acute possible stroke per recommendations of primary medical t
eam and neurologist.

2.I spoke with Dr. Florian Barton regarding consultation for this patient's testicular mass for eval
uation and for planning in the future after his acute medical issues are addressed.

3.No surgical intervention from a general surgical standpoint at this time and as such I will sign o
ff on the case.  However, I will 

remain available should you need me for any future needs. 

Thank you for this interesting consult.





MISTY/RICHARD

DD:  08/29/2022 17:46:53Voice ID:  [id number]

DT:  08/29/2022 20:06:25Report ID:  [id number]

## 2022-08-30 LAB
ALBUMIN SERPL BCP-MCNC: 3 G/DL (ref 3.4–5)
ALP SERPL-CCNC: 77 U/L (ref 45–117)
ALT SERPL W P-5'-P-CCNC: 22 U/L (ref 12–78)
AST SERPL W P-5'-P-CCNC: 17 U/L (ref 15–37)
BUN BLD-MCNC: 30 MG/DL (ref 7–18)
GLUCOSE SERPLBLD-MCNC: 123 MG/DL (ref 74–106)
HCT VFR BLD CALC: 33.2 % (ref 39.6–49)
LYMPHOCYTES # SPEC AUTO: 0.6 K/UL (ref 0.7–4.9)
MCV RBC: 84.3 FL (ref 80–100)
PMV BLD: 9 FL (ref 7.6–11.3)
POTASSIUM SERPL-SCNC: 4.1 MMOL/L (ref 3.5–5.1)
RBC # BLD: 3.94 M/UL (ref 4.33–5.43)
TSH SERPL DL<=0.05 MIU/L-ACNC: 0.62 UIU/ML (ref 0.36–3.74)

## 2022-08-30 RX ADMIN — SODIUM CHLORIDE SCH MLS: 0.9 INJECTION, SOLUTION INTRAVENOUS at 17:28

## 2022-08-30 RX ADMIN — LOSARTAN POTASSIUM AND HYDROCHLOROTHIAZIDE SCH TAB: 50; 12.5 TABLET, FILM COATED ORAL at 09:30

## 2022-08-30 RX ADMIN — CLOPIDOGREL BISULFATE SCH: 75 TABLET, FILM COATED ORAL at 09:00

## 2022-08-30 RX ADMIN — HUMAN INSULIN SCH: 100 INJECTION, SOLUTION SUBCUTANEOUS at 16:30

## 2022-08-30 RX ADMIN — HUMAN INSULIN SCH: 100 INJECTION, SOLUTION SUBCUTANEOUS at 11:30

## 2022-08-30 RX ADMIN — HUMAN INSULIN SCH: 100 INJECTION, SOLUTION SUBCUTANEOUS at 07:30

## 2022-08-30 RX ADMIN — FOLIC ACID SCH MG: 1 TABLET ORAL at 09:30

## 2022-08-30 RX ADMIN — ASPIRIN 81 MG SCH MG: 81 TABLET ORAL at 09:30

## 2022-08-30 RX ADMIN — ATORVASTATIN CALCIUM SCH MG: 10 TABLET, FILM COATED ORAL at 21:09

## 2022-08-30 RX ADMIN — ACETAMINOPHEN PRN MG: 325 TABLET ORAL at 21:08

## 2022-08-30 RX ADMIN — ENOXAPARIN SODIUM SCH: 40 INJECTION SUBCUTANEOUS at 09:00

## 2022-08-30 RX ADMIN — HUMAN INSULIN SCH: 100 INJECTION, SOLUTION SUBCUTANEOUS at 20:56

## 2022-08-30 RX ADMIN — HYDRALAZINE HYDROCHLORIDE PRN MG: 20 INJECTION INTRAMUSCULAR; INTRAVENOUS at 00:52

## 2022-08-30 NOTE — P.PN
Subjective


Date of Service: 22


Chief Complaint: Acute Left-Sided Weakness


No acute events overnight. He reports persistent weakness in his left upper and 

left lower extremities. His left lower extremity seems to have increased in 

strength compared to yesterday. He denies any sensory deficits. He denies any 

chest pain, palpitations, shortness of breath, nausea/vomiting.





Review of Systems


10-point ROS is otherwise unremarkable


Neurological: Weakness (LUE/LLE)





Physical Examination





- Vital Signs


Temperature: 100.8 F


Blood Pressure: 169/76


Pulse: 75


Respirations: 18


Pulse Ox (%): 99





Assessment And Plan





- Plan


- Physical Exam


General: Alert, In no apparent distress, Oriented x3


HEENT: Atraumatic, PERRLA, Mucous membr. moist/pink, EOMI, Sclerae nonicteric


Neck: Supple, JVD not distended


Respiratory: Clear to auscultation bilaterally, Normal air movement


Cardiovascular: No edema, Regular rate/rhythm, Normal S1 S2, No gallops, No 

rubs, No murmurs


Gastrointestinal: Normal bowel sounds, Soft and benign, Non-distended, No 

tenderness, No rebound, No guarding


Musculoskeletal: No clubbing


Integumentary: No rashes


Neurological: Normal speech, Sensation intact, Cranial nerves 3-12 intact, 

Normal reflexes 2+, Normal affect, Other (5/5 strength in RUE/RLE. 2/5 strength 

in LUE. 3/4 strength in LLE.)








NIH Stroke Scale


1a. Level of consciousness: 0 - Alert; keenly responsive  


1b. LOC questions: 0 - Both questions right


1c. LOC commands: 0 - Performs both tasks


2. Best Gaze: 0 - Normal


3. Visual: 0 - No visual loss


4. Facial Palsy: 0 - Normal symmetry


5a. Motor left arm: 2 - some effort against gravity


5b. Motor right arm: 0 - No drift for 10 seconds


6a. Motor left le - drift, but doesn't hit bed 


6b. Motor right le - No drift for 5 seconds


7. Limb ataxia: 0 - No ataxia


8. Sensory: 0 - Normal; no sensory loss


9. Best Language: 0 - Normal; no aphasia


10. Dysarthria: 0 - Normal


11. Extinction and Inattention: 0 - No abnormality 


12. Distal motor function: 0 - No abnormality





Total Score: 3





# Acute Small Right Corona Radiata Cerebrovascular Accident


# Chronic Remote Left Corona Radiata Infarct 


# Moderate Left Internal Carotid Artery Stenosis 


- Consulted Neurology - recommendations appreciated 


- NIHSS = 3


- Evaulation thus far:


   - CT head = "no acute large vascular territory infarct or intracranial 

hemorrhage. Small areas of white matter hypoattenuation in the right corona 

radiata and left corona radiata/ basal ganglia could reflect age indeterminate 

lacunar infarcts. MRI could confirm."


   - MRI head = "small acute right corona radiata infarct. Small remote left 

corona radiata infarct. No abnormal enhancement."


   - MRA head = "no significant flow abnormality of the [place] is 

identified."


   - MRA neck = "no flow limiting stenosis is present within the neck."


   - Carotid US = "moderate (50-69%) stenosis at the left ICA. The right carotid

system is patent. The vertebral arteries are patent."


- q4hr neurochecks


- PT/OT evaluation requested 


- Ordered risk profile: 


   - Hgb A1c = 7.1 %


   - Lipid panel: , , LDL 62, HDL 53


   - TSH = 0.622


- Continue aspirin, atorvastatin, folic acid, clopidogrel





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 

cm)


- CT pelvis = "very large right scrotal mass may be a primary testicular tumor 

or liposarcoma. No lymphadenopathy is identified."


- Urology consulted and spoke with Dr. Barton - recommendations appreciated


   - Recommends CT abdomen/pelvis with contrast to evaluate for metastases - 

ordered


   - Recommends b-HCG, AFP, LDH - ordered





# Elevated Creatinine - Acute Kidney Injury vs Chronic Kidney Disease Stage III


# Microscopic Hematuria


- Creatinine = 1.59 -> 1.72 (baseline creatinine unknown) 


- Urinalysis = 3+ glucose, 2+ blood, 5-10 RBCs, 3+ protein


- Monitor creatinine and urine output 


   - If worsening, obtain renal ultrasound 


- Renally dose medications





# Hypertensive Urgency


- PRN hydralazine


- Continue home meds





# Type II Diabetes Mellitus


- Hgb A1c = 7.1 %


- Correction scale insulin ordered








Campos Delgadillo M.D.

## 2022-08-30 NOTE — RAD REPORT
EXAM DESCRIPTION:  CT - Abdomen   Pelvis W Contrast - 8/30/2022 11:05 am

 

CLINICAL HISTORY:  Abdominal pain/testicular cancer

 

COMPARISON:  none.

 

TECHNIQUE:  Computed axial tomography of the abdomen pelvis was obtained. 100 cc Isovue-300 was admin
istered intravenously. Oral contrast was not requested which limits evaluation of bowel and appendix

 

All CT scans are performed using dose optimization technique as appropriate and may include automated
 exposure control or mA/KV adjustment according to patient size.

 

FINDINGS:  The liver, spleen, pancreas, adrenal and kidneys appear unremarkable.

 

There is no evidence of diverticulitis.

 

Normal appendix

 

No lymphadenopathy

 

Marked enlargement of the scrotum.

 

IMPRESSION:  No evidence of metastatic disease

 

Marked scrotal enlargement. Testicular ultrasound is recommended

## 2022-08-31 LAB
ALBUMIN SERPL BCP-MCNC: 2.8 G/DL (ref 3.4–5)
ALP SERPL-CCNC: 70 U/L (ref 45–117)
ALT SERPL W P-5'-P-CCNC: 21 U/L (ref 12–78)
AST SERPL W P-5'-P-CCNC: 17 U/L (ref 15–37)
BUN BLD-MCNC: 30 MG/DL (ref 7–18)
GLUCOSE SERPLBLD-MCNC: 134 MG/DL (ref 74–106)
HCT VFR BLD CALC: 31.2 % (ref 39.6–49)
LYMPHOCYTES # SPEC AUTO: 1.1 K/UL (ref 0.7–4.9)
MCV RBC: 84.7 FL (ref 80–100)
PMV BLD: 8.9 FL (ref 7.6–11.3)
POTASSIUM SERPL-SCNC: 4 MMOL/L (ref 3.5–5.1)
RBC # BLD: 3.69 M/UL (ref 4.33–5.43)

## 2022-08-31 RX ADMIN — ACETAMINOPHEN PRN MG: 325 TABLET ORAL at 20:38

## 2022-08-31 RX ADMIN — SODIUM CHLORIDE SCH MLS: 0.9 INJECTION, SOLUTION INTRAVENOUS at 05:16

## 2022-08-31 RX ADMIN — HYDRALAZINE HYDROCHLORIDE PRN MG: 20 INJECTION INTRAMUSCULAR; INTRAVENOUS at 18:08

## 2022-08-31 RX ADMIN — HUMAN INSULIN SCH: 100 INJECTION, SOLUTION SUBCUTANEOUS at 11:30

## 2022-08-31 RX ADMIN — HYDRALAZINE HYDROCHLORIDE PRN MG: 20 INJECTION INTRAMUSCULAR; INTRAVENOUS at 12:17

## 2022-08-31 RX ADMIN — BENZONATATE PRN MG: 100 CAPSULE ORAL at 10:38

## 2022-08-31 RX ADMIN — FOLIC ACID SCH MG: 1 TABLET ORAL at 09:14

## 2022-08-31 RX ADMIN — ENOXAPARIN SODIUM SCH: 40 INJECTION SUBCUTANEOUS at 08:14

## 2022-08-31 RX ADMIN — ATORVASTATIN CALCIUM SCH MG: 10 TABLET, FILM COATED ORAL at 20:35

## 2022-08-31 RX ADMIN — SODIUM CHLORIDE SCH MLS: 0.9 INJECTION, SOLUTION INTRAVENOUS at 17:36

## 2022-08-31 RX ADMIN — LOSARTAN POTASSIUM AND HYDROCHLOROTHIAZIDE SCH TAB: 50; 12.5 TABLET, FILM COATED ORAL at 09:14

## 2022-08-31 RX ADMIN — HUMAN INSULIN SCH: 100 INJECTION, SOLUTION SUBCUTANEOUS at 20:29

## 2022-08-31 RX ADMIN — HUMAN INSULIN SCH: 100 INJECTION, SOLUTION SUBCUTANEOUS at 07:30

## 2022-08-31 RX ADMIN — HUMAN INSULIN SCH: 100 INJECTION, SOLUTION SUBCUTANEOUS at 16:04

## 2022-08-31 RX ADMIN — ACETAMINOPHEN PRN MG: 325 TABLET ORAL at 09:14

## 2022-08-31 NOTE — RAD REPORT
EXAM DESCRIPTION:  Ophelia Single View8/31/2022 12:43 pm

 

CLINICAL HISTORY:  Cough

 

COMPARISON:  August 28, 2022

 

FINDINGS:   The lungs appear clear of acute infiltrate. The heart is normal size

 

IMPRESSION:   No acute abnormalities displayed

## 2022-08-31 NOTE — P.PN
Subjective


Date of Service: 22


Chief Complaint: Acute Left-Sided Weakness


No acute events overnight. He was ambulating with assistance in the room on 

rounds this morning. He denies any sensory deficits. He denies any chest pain, 

palpitations, shortness of breath, nausea/vomiting. He is eager for discharge to

acute rehab.





Review of Systems


10-point ROS is otherwise unremarkable


Neurological: Weakness (LUE/LLE)





Physical Examination





- Vital Signs


Temperature: 101.4 F


Blood Pressure: 153/59


Pulse: 72


Respirations: 16


Pulse Ox (%): 99





Assessment And Plan





- Plan


- Physical Exam


General: Alert, In no apparent distress, Oriented x3


HEENT: Atraumatic, PERRLA, Mucous membr. moist/pink, EOMI, Sclerae nonicteric


Neck: Supple, JVD not distended


Respiratory: Clear to auscultation bilaterally, Normal air movement


Cardiovascular: No edema, Regular rate/rhythm, Normal S1 S2, No gallops, No 

rubs, No murmurs


Gastrointestinal: Normal bowel sounds, Soft and benign, Non-distended, No 

tenderness, No rebound, No guarding


Musculoskeletal: No clubbing


Integumentary: No rashes


Neurological: Normal speech, Sensation intact, Cranial nerves 3-12 intact, 

Normal reflexes 2+, Normal affect, Other (5/5 strength in RUE/RLE. 2/5 strength 

in LUE. 3/4 strength in LLE.)








NIH Stroke Scale


1a. Level of consciousness: 0 - Alert; keenly responsive  


1b. LOC questions: 0 - Both questions right


1c. LOC commands: 0 - Performs both tasks


2. Best Gaze: 0 - Normal


3. Visual: 0 - No visual loss


4. Facial Palsy: 0 - Normal symmetry


5a. Motor left arm: 2 - some effort against gravity


5b. Motor right arm: 0 - No drift for 10 seconds


6a. Motor left le - drift, but doesn't hit bed 


6b. Motor right le - No drift for 5 seconds


7. Limb ataxia: 0 - No ataxia


8. Sensory: 0 - Normal; no sensory loss


9. Best Language: 0 - Normal; no aphasia


10. Dysarthria: 0 - Normal


11. Extinction and Inattention: 0 - No abnormality 


12. Distal motor function: 0 - No abnormality





Total Score: 3





# Acute Small Right Corona Radiata Cerebrovascular Accident


# Chronic Remote Left Corona Radiata Infarct 


# Moderate Left Internal Carotid Artery Stenosis 


- Consulted Neurology - recommendations appreciated 


- NIHSS = 3


- Evaulation thus far:


   - CT head = "no acute large vascular territory infarct or intracranial 

hemorrhage. Small areas of white matter hypoattenuation in the right corona 

radiata and left corona radiata/ basal ganglia could reflect age indeterminate 

lacunar infarcts. MRI could confirm."


   - MRI head = "small acute right corona radiata infarct. Small remote left 

corona radiata infarct. No abnormal enhancement."


   - MRA head = "no significant flow abnormality of the [place] is 

identified."


   - MRA neck = "no flow limiting stenosis is present within the neck."


   - Carotid US = "moderate (50-69%) stenosis at the left ICA. The right carotid

system is patent. The vertebral arteries are patent."


- q4hr neurochecks


- PT/OT evaluation requested 


- Ordered risk profile: 


   - Hgb A1c = 7.1 %


   - Lipid panel: , , LDL 62, HDL 53


   - TSH = 0.622


- Continue aspirin, atorvastatin, folic acid, clopidogrel





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 

cm)


- CT pelvis = "very large right scrotal mass may be a primary testicular tumor 

or liposarcoma. No lymphadenopathy is identified."


- Urology consulted and spoke with Dr. Barton - recommendations appreciated


   - Recommends CT abdomen/pelvis with contrast to evaluate for metastases - 

ordered


   - Recommends b-HCG, AFP, LDH - ordered





# Elevated Creatinine - Acute Kidney Injury vs Chronic Kidney Disease Stage III


# Microscopic Hematuria


- Creatinine = 1.59 -> 1.72 (baseline creatinine unknown) 


- Urinalysis = 3+ glucose, 2+ blood, 5-10 RBCs, 3+ protein


- Monitor creatinine and urine output 


   - If worsening, obtain renal ultrasound 


- Renally dose medications





# Hypertensive Urgency


- PRN hydralazine


- Continue home meds





# Type II Diabetes Mellitus


- Hgb A1c = 7.1 %


- Correction scale insulin ordered





No new changes today. Awaiting acceptance into acute rehab,





Campos Delgadillo M.D.

## 2022-09-01 LAB
ALBUMIN SERPL BCP-MCNC: 2.7 G/DL (ref 3.4–5)
ALP SERPL-CCNC: 68 U/L (ref 45–117)
ALT SERPL W P-5'-P-CCNC: 22 U/L (ref 12–78)
AST SERPL W P-5'-P-CCNC: 26 U/L (ref 15–37)
BUN BLD-MCNC: 24 MG/DL (ref 7–18)
GLUCOSE SERPLBLD-MCNC: 120 MG/DL (ref 74–106)
HCT VFR BLD CALC: 31.6 % (ref 39.6–49)
LYMPHOCYTES # SPEC AUTO: 0.7 K/UL (ref 0.7–4.9)
MCV RBC: 83.8 FL (ref 80–100)
PMV BLD: 8.9 FL (ref 7.6–11.3)
POTASSIUM SERPL-SCNC: 3.6 MMOL/L (ref 3.5–5.1)
RBC # BLD: 3.76 M/UL (ref 4.33–5.43)

## 2022-09-01 RX ADMIN — ENOXAPARIN SODIUM SCH: 40 INJECTION SUBCUTANEOUS at 09:00

## 2022-09-01 RX ADMIN — HUMAN INSULIN SCH UNIT: 100 INJECTION, SOLUTION SUBCUTANEOUS at 11:30

## 2022-09-01 RX ADMIN — BENZONATATE PRN MG: 100 CAPSULE ORAL at 11:40

## 2022-09-01 RX ADMIN — ATORVASTATIN CALCIUM SCH MG: 10 TABLET, FILM COATED ORAL at 22:08

## 2022-09-01 RX ADMIN — LOSARTAN POTASSIUM AND HYDROCHLOROTHIAZIDE SCH TAB: 50; 12.5 TABLET, FILM COATED ORAL at 08:59

## 2022-09-01 RX ADMIN — HUMAN INSULIN SCH: 100 INJECTION, SOLUTION SUBCUTANEOUS at 07:30

## 2022-09-01 RX ADMIN — HUMAN INSULIN SCH UNIT: 100 INJECTION, SOLUTION SUBCUTANEOUS at 22:08

## 2022-09-01 RX ADMIN — SODIUM CHLORIDE SCH MLS: 0.9 INJECTION, SOLUTION INTRAVENOUS at 18:09

## 2022-09-01 RX ADMIN — SODIUM CHLORIDE SCH: 0.9 INJECTION, SOLUTION INTRAVENOUS at 22:40

## 2022-09-01 RX ADMIN — HYDRALAZINE HYDROCHLORIDE PRN MG: 20 INJECTION INTRAMUSCULAR; INTRAVENOUS at 11:40

## 2022-09-01 RX ADMIN — HUMAN INSULIN SCH: 100 INJECTION, SOLUTION SUBCUTANEOUS at 16:18

## 2022-09-01 RX ADMIN — ACETAMINOPHEN PRN MG: 325 TABLET ORAL at 22:09

## 2022-09-01 RX ADMIN — BENZONATATE PRN MG: 100 CAPSULE ORAL at 01:47

## 2022-09-01 RX ADMIN — FOLIC ACID SCH MG: 1 TABLET ORAL at 08:59

## 2022-09-01 RX ADMIN — SODIUM CHLORIDE SCH MLS: 0.9 INJECTION, SOLUTION INTRAVENOUS at 06:36

## 2022-09-01 RX ADMIN — HYDRALAZINE HYDROCHLORIDE PRN MG: 20 INJECTION INTRAMUSCULAR; INTRAVENOUS at 04:56

## 2022-09-01 NOTE — P.PN
Subjective


Date of Service: 22


Chief Complaint: Acute Left-Sided Weakness


No acute events overnight. He appears well this morning. [facility] 

representative was in the room speaking with him this morning on rounds. 

Anticipate discharge likely tomorrow.





Review of Systems


10-point ROS is otherwise unremarkable


General: Weakness





Physical Examination





- Vital Signs


Temperature: 100.3 F


Blood Pressure: 168/76


Pulse: 89


Respirations: 19


Pulse Ox (%): 96





Assessment And Plan





- Plan


- Physical Exam


General: Alert, In no apparent distress, Oriented x3


HEENT: Atraumatic, PERRLA, Mucous membr. moist/pink, EOMI, Sclerae nonicteric


Neck: Supple, JVD not distended


Respiratory: Clear to auscultation bilaterally, Normal air movement


Cardiovascular: No edema, Regular rate/rhythm, Normal S1 S2, No gallops, No 

rubs, No murmurs


Gastrointestinal: Normal bowel sounds, Soft and benign, Non-distended, No 

tenderness, No rebound, No guarding


Musculoskeletal: No clubbing


Integumentary: No rashes


Neurological: Normal speech, Sensation intact, Cranial nerves 3-12 intact, 

Normal reflexes 2+, Normal affect, Other (5/5 strength in RUE/RLE. 2/5 strength 

in LUE. 3/4 strength in LLE.)








NIH Stroke Scale


1a. Level of consciousness: 0 - Alert; keenly responsive  


1b. LOC questions: 0 - Both questions right


1c. LOC commands: 0 - Performs both tasks


2. Best Gaze: 0 - Normal


3. Visual: 0 - No visual loss


4. Facial Palsy: 0 - Normal symmetry


5a. Motor left arm: 2 - some effort against gravity


5b. Motor right arm: 0 - No drift for 10 seconds


6a. Motor left le - drift, but doesn't hit bed 


6b. Motor right le - No drift for 5 seconds


7. Limb ataxia: 0 - No ataxia


8. Sensory: 0 - Normal; no sensory loss


9. Best Language: 0 - Normal; no aphasia


10. Dysarthria: 0 - Normal


11. Extinction and Inattention: 0 - No abnormality 


12. Distal motor function: 0 - No abnormality





Total Score: 3





# Acute Small Right Corona Radiata Cerebrovascular Accident


# Chronic Remote Left Corona Radiata Infarct 


# Moderate Left Internal Carotid Artery Stenosis 


- Consulted Neurology - recommendations appreciated 


- NIHSS = 3


- Evaulation thus far:


   - CT head = "no acute large vascular territory infarct or intracranial hemor

rhage. Small areas of white matter hypoattenuation in the right corona radiata 

and left corona radiata/ basal ganglia could reflect age indeterminate lacunar 

infarcts. MRI could confirm."


   - MRI head = "small acute right corona radiata infarct. Small remote left 

corona radiata infarct. No abnormal enhancement."


   - MRA head = "no significant flow abnormality of the [place] is 

identified."


   - MRA neck = "no flow limiting stenosis is present within the neck."


   - Carotid US = "moderate (50-69%) stenosis at the left ICA. The right carotid

system is patent. The vertebral arteries are patent."


- q4hr neurochecks


- PT/OT evaluation requested 


- Ordered risk profile: 


   - Hgb A1c = 7.1 %


   - Lipid panel: , , LDL 62, HDL 53


   - TSH = 0.622


- Continue aspirin, atorvastatin, folic acid, clopidogrel





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 

cm)


- CT pelvis = "very large right scrotal mass may be a primary testicular tumor 

or liposarcoma. No lymphadenopathy is identified."


- Urology consulted and spoke with Dr. Barton - recommendations appreciated


   - Recommends CT abdomen/pelvis with contrast to evaluate for metastases - 

ordered


   - Recommends b-HCG, AFP, LDH - ordered





# Elevated Creatinine - Acute Kidney Injury vs Chronic Kidney Disease Stage III


# Microscopic Hematuria


- Creatinine = 1.59 -> 1.72-> 1.65 (baseline creatinine unknown) 


- Urinalysis = 3+ glucose, 2+ blood, 5-10 RBCs, 3+ protein


- Monitor creatinine and urine output 


   - If worsening, obtain renal ultrasound 


- Renally dose medications





# Hypertensive Urgency


- PRN hydralazine


- Continue home meds





# Type II Diabetes Mellitus


- Hgb A1c = 7.1 %


- Correction scale insulin ordered





No new changes today. Awaiting acceptance into acute rehab tomorrow,





Campos Delgadillo M.D.

## 2022-09-01 NOTE — ECHO
HEIGHT: 5 ft 9 in   WEIGHT: 180 lb 0 oz   DATE OF STUDY: 08/30/2022   REFER DR: Campos Delgadillo MD

2-DIMENSIONAL: YES

     M.MODE: YES

 DOPPLER: YES

COLOR FLOW: YES



                    TDS:  NO

PORTABLE: YES

 DEFINITY:  NO

BUBBLE STUDY: NO





DIAGNOSIS:  STROKE EVALUATION



CARDIAC HISTORY:  

CATHERIZATION: 

SURGERY: 

PROSTHETIC VALVE: 

PACEMAKER: 





MEASUREMENTS (cm)

    DIASTOLIC (NORMALS)                 SYSTOLIC (NORMALS)

IVSd                 1.4 (0.6-1.2)                    LA Diam 3.8 (1.9-4.0)     LVEF       
  65-69%  

LVIDd               4.3 (3.5-5.7)                        LVIDs      3.2 (2.0-3.5)     %FS  
        %

LVPWd             1.4 (0.6-1.2)

Ao Diam           3.4 (2.0-3.7)



2 DIMENSIONAL ASSESSMENT:

RIGHT ATRIUM:                   NORMAL

LEFT ATRIUM:       NORMAL



RIGHT VENTRICLE:            NORMAL

LEFT VENTRICLE: NORMAL



TRICUSPID VALVE:             NORMAL

MITRAL VALVE:     NORMAL



PULMONIC VALVE:             NORMAL

AORTIC VALVE:     NORMAL



PERICARDIAL EFFUSION: NONE

AORTIC ROOT:      NORMAL





LEFT VENTRICULAR WALL MOTION:     NORMAL



DOPPLER/COLOR FLOW:     NORMAL



COMMENTS:      LEFT VENTRICULAR HYPERTROPHY. NORMAL LEFT VENTRICULAR EJECTION FRACTION.

NO THROMBUS. NO VEGETATION.



TECHNOLOGIST:   KURTIS PANTOJA

## 2022-09-02 VITALS — OXYGEN SATURATION: 99 %

## 2022-09-02 LAB
ALBUMIN SERPL BCP-MCNC: 2.5 G/DL (ref 3.4–5)
ALP SERPL-CCNC: 64 U/L (ref 45–117)
ALT SERPL W P-5'-P-CCNC: 27 U/L (ref 12–78)
AST SERPL W P-5'-P-CCNC: 30 U/L (ref 15–37)
BLD SMEAR INTERP: (no result)
BUN BLD-MCNC: 26 MG/DL (ref 7–18)
GLUCOSE SERPLBLD-MCNC: 128 MG/DL (ref 74–106)
HCT VFR BLD CALC: 30.7 % (ref 39.6–49)
LYMPHOCYTES # SPEC AUTO: 0.7 K/UL (ref 0.7–4.9)
MCV RBC: 82.1 FL (ref 80–100)
MORPHOLOGY BLD-IMP: (no result)
PMV BLD: 8.9 FL (ref 7.6–11.3)
POTASSIUM SERPL-SCNC: 3.6 MMOL/L (ref 3.5–5.1)
RBC # BLD: 3.74 M/UL (ref 4.33–5.43)

## 2022-09-02 RX ADMIN — SODIUM CHLORIDE SCH MLS: 0.9 INJECTION, SOLUTION INTRAVENOUS at 05:27

## 2022-09-02 RX ADMIN — HUMAN INSULIN SCH: 100 INJECTION, SOLUTION SUBCUTANEOUS at 07:30

## 2022-09-02 RX ADMIN — HUMAN INSULIN SCH: 100 INJECTION, SOLUTION SUBCUTANEOUS at 21:00

## 2022-09-02 RX ADMIN — SODIUM CHLORIDE SCH MLS: 0.9 INJECTION, SOLUTION INTRAVENOUS at 21:02

## 2022-09-02 RX ADMIN — ACETAMINOPHEN PRN MG: 325 TABLET ORAL at 09:03

## 2022-09-02 RX ADMIN — ACETAMINOPHEN PRN MG: 325 TABLET ORAL at 17:22

## 2022-09-02 RX ADMIN — HUMAN INSULIN SCH: 100 INJECTION, SOLUTION SUBCUTANEOUS at 11:30

## 2022-09-02 RX ADMIN — HYDRALAZINE HYDROCHLORIDE PRN MG: 20 INJECTION INTRAMUSCULAR; INTRAVENOUS at 05:27

## 2022-09-02 RX ADMIN — ENOXAPARIN SODIUM SCH: 40 INJECTION SUBCUTANEOUS at 09:00

## 2022-09-02 RX ADMIN — FOLIC ACID SCH MG: 1 TABLET ORAL at 09:02

## 2022-09-02 RX ADMIN — LOSARTAN POTASSIUM AND HYDROCHLOROTHIAZIDE SCH TAB: 50; 12.5 TABLET, FILM COATED ORAL at 09:05

## 2022-09-02 RX ADMIN — ATORVASTATIN CALCIUM SCH MG: 10 TABLET, FILM COATED ORAL at 21:04

## 2022-09-02 RX ADMIN — HUMAN INSULIN SCH: 100 INJECTION, SOLUTION SUBCUTANEOUS at 15:59

## 2022-09-02 NOTE — P.DS
Admission Date: 08/28/22


Discharge Date: 09/02/22


Disposition: TRANSFER TO INPATIENT REHAB


Discharge Condition: FAIR


Reason for Admission: Acute Left-Sided Weakness


Vital Signs/Physical Exam: 














Temp Pulse Resp BP Pulse Ox


 


 98.6 F   77   28 H  174/77 H  95 


 


 09/02/22 12:00  09/02/22 12:00  09/02/22 12:00  09/02/22 12:00  09/02/22 12:00








Laboratory Data at Discharge: 














WBC  5.40 K/uL (4.3-10.9)  D 09/02/22  07:33    


 


Hgb  10.7 g/dL (13.6-17.9)  L  09/02/22  07:33    


 


Hct  30.7 % (39.6-49.0)  L  09/02/22  07:33    


 


Plt Count  44 K/uL (152-406)  L*  09/02/22  07:33    


 


PT  11.8 SECONDS (9.5-12.5)   08/28/22  09:37    


 


INR  1.07   08/28/22  09:37    


 


APTT  35.8 SECONDS (24.3-36.9)   08/28/22  09:37    


 


Sodium  135 mmol/L (136-145)  L  09/02/22  07:33    


 


Potassium  3.6 mmol/L (3.5-5.1)   09/02/22  07:33    


 


BUN  26 mg/dL (7-18)  H  09/02/22  07:33    


 


Creatinine  1.62 mg/dL (0.55-1.3)  H  09/02/22  07:33    


 


Glucose  128 mg/dL ()  H  09/02/22  07:33    


 


Magnesium  2.0 mg/dL (1.8-2.4)   08/28/22  09:37    


 


Total Bilirubin  0.7 mg/dL (0.2-1.0)   09/02/22  07:33    


 


AST  30 U/L (15-37)   09/02/22  07:33    


 


ALT  27 U/L (12-78)   09/02/22  07:33    


 


Alkaline Phosphatase  64 U/L ()   09/02/22  07:33    


 


Triglycerides  104 mg/dL (<150)   08/29/22  02:34    


 


Cholesterol  136 mg/dL (<200)   08/29/22  02:34    


 


HDL Cholesterol  53 mg/dL (40-60)   08/29/22  02:34    


 


Cholesterol/HDL Ratio  2.57   08/29/22  02:34    








Home Medications: 








Glipizide [Glipizide Xl] 10 mg PO BID 08/28/22 


Metformin HCl 1 tab PO BID 08/28/22 


Aspirin Chewable [Aspirin Chewable*] 81 mg PO DAILY  tab.chew 09/02/22 


Atorvastatin Calcium [Lipitor*] 40 mg PO BEDTIME  tab 09/02/22 


Clopidogrel Bisulfate [Plavix*] 75 mg PO DAILY 09/02/22 





Physician Discharge Instructions: 


PLEASE COMPLETE GOLD FORM FOR STROKE


PLEASE COMPLETE PATIENT SATISFACTION FOR STROKE


Diet: AHA


Activity: [place]


Followup: 


NONE,NONE [Primary Care Provider] -

## 2022-09-02 NOTE — RAD REPORT
EXAM DESCRIPTION:  US - Extrem Venous W Compress Basim - 9/2/2022 2:53 pm

 

CLINICAL HISTORY:  Rule out DVT

Bilateral leg edema and swelling.

 

COMPARISON:  No comparisons

 

TECHNIQUE:  Real-time sonographic interrogation of the left and right lower extremity deep venous sys
tems was performed.

 

FINDINGS:  Normal compressibility, flow augmentation, phasic flow and spontaneous flow is identified 
in both the left and right lower extremity deep venous systems.

 

IMPRESSION:  No sonographic evidence of left or right lower extremity deep venous thrombosis.

## 2022-09-02 NOTE — P.PN
Subjective


Date of Service: 22


Chief Complaint: Acute Left-Sided Weakness


No acute events overnight. He appears well this morning. Was accepted and 

planning for discharge to acute rehab, but decided to keep in house for possibly

orchiectomy next week.





Review of Systems


10-point ROS is otherwise unremarkable


General: Weakness (general)





Physical Examination





- Vital Signs


Temperature: 99.0 F


Blood Pressure: 175/79


Pulse: 80


Respirations: 17


Pulse Ox (%): 96





Assessment And Plan





- Plan


- Physical Exam


General: Alert, In no apparent distress, Oriented x3


HEENT: Atraumatic, PERRLA, Mucous membr. moist/pink, EOMI, Sclerae nonicteric


Neck: Supple, JVD not distended


Respiratory: Clear to auscultation bilaterally, Normal air movement


Cardiovascular: No edema, Regular rate/rhythm, Normal S1 S2, No gallops, No 

rubs, No murmurs


Gastrointestinal: Normal bowel sounds, Soft and benign, Non-distended, No 

tenderness, No rebound, No guarding


Musculoskeletal: No clubbing


Integumentary: No rashes


Neurological: Normal speech, Sensation intact, Cranial nerves 3-12 intact, 

Normal reflexes 2+, Normal affect, Other (5/5 strength in RUE/RLE. 2/5 strength 

in LUE. 3/4 strength in LLE.)








NIH Stroke Scale


1a. Level of consciousness: 0 - Alert; keenly responsive  


1b. LOC questions: 0 - Both questions right


1c. LOC commands: 0 - Performs both tasks


2. Best Gaze: 0 - Normal


3. Visual: 0 - No visual loss


4. Facial Palsy: 0 - Normal symmetry


5a. Motor left arm: 2 - some effort against gravity


5b. Motor right arm: 0 - No drift for 10 seconds


6a. Motor left le - drift, but doesn't hit bed 


6b. Motor right le - No drift for 5 seconds


7. Limb ataxia: 0 - No ataxia


8. Sensory: 0 - Normal; no sensory loss


9. Best Language: 0 - Normal; no aphasia


10. Dysarthria: 0 - Normal


11. Extinction and Inattention: 0 - No abnormality 


12. Distal motor function: 0 - No abnormality





Total Score: 3





# Acute Small Right Corona Radiata Cerebrovascular Accident


# Chronic Remote Left Corona Radiata Infarct 


# Moderate Left Internal Carotid Artery Stenosis 


- Consulted Neurology - recommendations appreciated 


- NIHSS = 3


- Evaulation thus far:


   - CT head = "no acute large vascular territory infarct or intracranial 

hemorrhage. Small areas of white matter hypoattenuation in the right corona 

radiata and left corona radiata/ basal ganglia could reflect age indeterminate 

lacunar infarcts. MRI could confirm."


   - MRI head = "small acute right corona radiata infarct. Small remote left 

corona radiata infarct. No abnormal enhancement."


   - MRA head = "no significant flow abnormality of the [place] is 

identified."


   - MRA neck = "no flow limiting stenosis is present within the neck."


   - Carotid US = "moderate (50-69%) stenosis at the left ICA. The right carotid

system is patent. The vertebral arteries are patent."


- q4hr neurochecks


- PT/OT evaluation requested 


- Ordered risk profile: 


   - Hgb A1c = 7.1 %


   - Lipid panel: , , LDL 62, HDL 53


   - TSH = 0.622


- Continue aspirin, atorvastatin, folic acid, clopidogrel





# Acute Thrombocytopenia


- Possibly secondary to HIT vs DAPT


- Hold DAPT for now and discuss with Neurology





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 

cm)


- CT pelvis = "very large right scrotal mass may be a primary testicular tumor 

or liposarcoma. No lymphadenopathy is identified."


- Urology consulted and spoke with Dr. Barton - recommendations appreciated


   - Recommends CT abdomen/pelvis with contrast to evaluate for metastases - 

ordered


   - Recommends b-HCG, AFP, LDH - ordered





# Elevated Creatinine - Acute Kidney Injury vs Chronic Kidney Disease Stage III


# Microscopic Hematuria


- Creatinine = 1.59 -> 1.72-> 1.65 (baseline creatinine unknown) 


- Urinalysis = 3+ glucose, 2+ blood, 5-10 RBCs, 3+ protein


- Monitor creatinine and urine output 


   - If worsening, obtain renal ultrasound 


- Renally dose medications





# Hypertensive Urgency


- PRN hydralazine


- Continue home meds





# Type II Diabetes Mellitus


- Hgb A1c = 7.1 %


- Correction scale insulin ordered





No new changes today. Awaiting acceptance into acute rehab tomorrow,





Campos Delgadillo M.D.

## 2022-09-03 VITALS — SYSTOLIC BLOOD PRESSURE: 143 MMHG | DIASTOLIC BLOOD PRESSURE: 67 MMHG | TEMPERATURE: 98.8 F

## 2022-09-03 LAB
AFP-TM SERPL-ACNC: 1.5 NG/ML (ref ?–6.1)
ALBUMIN SERPL BCP-MCNC: 2.1 G/DL (ref 3.4–5)
ALP SERPL-CCNC: 57 U/L (ref 45–117)
ALT SERPL W P-5'-P-CCNC: 33 U/L (ref 12–78)
AST SERPL W P-5'-P-CCNC: 41 U/L (ref 15–37)
BUN BLD-MCNC: 26 MG/DL (ref 7–18)
GLUCOSE SERPLBLD-MCNC: 114 MG/DL (ref 74–106)
HCT VFR BLD CALC: 29.6 % (ref 39.6–49)
LYMPHOCYTES # SPEC AUTO: 1 K/UL (ref 0.7–4.9)
MCV RBC: 82 FL (ref 80–100)
PMV BLD: 8.7 FL (ref 7.6–11.3)
POTASSIUM SERPL-SCNC: 3 MMOL/L (ref 3.5–5.1)
RBC # BLD: 3.61 M/UL (ref 4.33–5.43)

## 2022-09-03 RX ADMIN — SODIUM CHLORIDE SCH MLS: 0.9 INJECTION, SOLUTION INTRAVENOUS at 09:57

## 2022-09-03 RX ADMIN — ENOXAPARIN SODIUM SCH MG: 40 INJECTION SUBCUTANEOUS at 09:53

## 2022-09-03 RX ADMIN — ACETAMINOPHEN PRN MG: 325 TABLET ORAL at 09:53

## 2022-09-03 RX ADMIN — ENOXAPARIN SODIUM SCH MG: 40 INJECTION SUBCUTANEOUS at 09:58

## 2022-09-03 RX ADMIN — FOLIC ACID SCH MG: 1 TABLET ORAL at 09:58

## 2022-09-03 RX ADMIN — HYDRALAZINE HYDROCHLORIDE PRN MG: 20 INJECTION INTRAMUSCULAR; INTRAVENOUS at 01:31

## 2022-09-03 RX ADMIN — HUMAN INSULIN SCH: 100 INJECTION, SOLUTION SUBCUTANEOUS at 07:30

## 2022-09-03 RX ADMIN — HUMAN INSULIN SCH: 100 INJECTION, SOLUTION SUBCUTANEOUS at 11:30

## 2022-09-03 RX ADMIN — LOSARTAN POTASSIUM AND HYDROCHLOROTHIAZIDE SCH TAB: 50; 12.5 TABLET, FILM COATED ORAL at 09:59

## 2022-09-03 NOTE — RAD REPORT
EXAM DESCRIPTION:  RAD - Shoulder  Left 2 View - 9/3/2022 10:52 am

 

CLINICAL HISTORY:  left shoulder concern

 

COMPARISON:  <Comparisons>

 

FINDINGS/IMPRESSION:  No acute fracture. No malalignment. Mild left AC joint degenerative changes wit
h subacromial spurring.

## 2022-09-03 NOTE — P.DS
Admission Date: 22


Discharge Date: 22


Disposition: TRANSFER TO INPATIENT REHAB


Comment: [facility]


Discharge Condition: FAIR


Reason for Admission: Acute Left-Sided Weakness


Consultations: 


1. Neurology 


2. Urology


Hospital Course: 








DIAGNOSES:


# Acute Small Right Corona Radiata Cerebrovascular Accident


# Chronic Remote Left Corona Radiata Infarct 


# Moderate Left Internal Carotid Artery Stenosis 


# Acute Thrombocytopenia, stable


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 

cm)


# Elevated Creatinine - Acute Kidney Injury vs Chronic Kidney Disease Stage III


# Microscopic Hematuria


# Hypertensive Urgency


# Type II Diabetes Mellitus








HOSPITAL COURSE:


Mr. Alden Jimenez is a pleasant 67 year old male with a past medical history 

significant for a chronic remote left corona radiata CVA, moderate left ICA 

stenosis, hypertension, and type II diabetes mellitus who was admitted to the 

[facility] on 2022 for acute left-sided weakness.





He was admitted to the Medicine service for further evaluation. Neurology was 

consulted and he was evaluated by Dr. Donovan. His evaluation was notable for 

the following: CT head = "no acute large vascular territory infarct or 

intracranial hemorrhage. Small areas of white matter hypoattenuation in the 

right corona radiata and left corona radiata/ basal ganglia could reflect age 

indeterminate lacunar infarcts. MRI could confirm," MRI head = "small acute 

right corona radiata infarct. Small remote left corona radiata infarct. No 

abnormal enhancement," MRA head = "no significant flow abnormality of the [place]

of [place] is identified," MRA neck = "no flow limiting stenosis is present 

within the neck," and carotid US = "moderate (50-69%) stenosis at the left ICA. 

The right carotid system is patent. The vertebral arteries are patent." He was 

started on aspirin, atorvastatin, folic acid, and clopidogrel; however, his 

hospital course complicated by some thrombocytopenia. I discussed this with Dr. Donovan, who recommended that we hold aspirin and clopidogrel temporarily. When

his platelet count stabilizes, plan is to resume aspirin followed by clopidogrel

if tolerated.      





Incidentally, he was found to have a large right testicular mass. A CT pelvis 

was performed, which revealed, "very large right scrotal mass may be primary 

testicular tumor or liposarcoma.  No lymphadenopathy is identified." Urology was

consulted and he was evaluated by Dr. Barton. Dr. Barton recommended that he 

obtain a CT abdomen/pelvis, which revealed, "no evidence of metastatic disease."

Tumor markers (AFP, b-HCG) were sent, but are pending at the time of discharge. 

Per discussion with Dr. Barton, he feels that the orchiectomy needs to be done 

as soon as possible, but he will not feel comfortable doing the procedure until 

2022 given that he recently received aspirin + clopidogrel and the fact 

that he is thrombocytopenic.





With the assistance of case management, he was accepted to [facility] 

for acute rehab and a doc-to-doc was completed with Dr. Becki Johnson. Per Dr. Johnson, he will require about 3 weeks of therapy before being discharged home. 

Given that he has two time-sensitive issues (acute rehab and orchiectemy), I had

a detailed risks and benefits discussion with Mr. Jimenez, Ms. Gómez (his wife),

and Ms. Piedra (his daughter). I discussed that delaying acute rehab may result 

in sub-par improvement in his mobility/functionality. However, at the same time,

delaying his orchiectomy may result in metastatic spread of the tumor and 

potentially lead to stage IV cancer and death. They verbalized that they would 

prefer that he attend acute rehab first as his stroke is acute and his 

testicular mass has been present for 3+ years. Additionally, he mentions that 

his PCP evaluated the mass and told them it was non cancerous. I explained that 

their position is reasonable, and re-iterated the risk of metastatic cancer. Mr. Jimenez and his family verbalized their understanding and accept the risk of 

delayed orchiectomy.





I tried to make him an outpatient appointment with Dr. Barton in 3 weeks, but 

he stated that he will be out of town starting 09/15/2022. I spoke with another 

[facility] Urologist, Dr. Alvarez, and updated him on this case. Per Dr. Johnson,

anticipated discharge will be in 3 weeks (~2022). Dr. Alvarez stated 

that he will schedule him for an appointment on 2022. I have given Mr. Jimenez and his family the contact information for Dr. Alvarez's office and 

they are all in agreement with this plan. I have also updated Dr. Johnson on this 

plan of care. 





On 2022, he was seen on morning rounds and deemed medically stable for 

discharge. He and his family members were given the opportunity to ask questions

and reported no further questions. Furthermore, all questions were answered to 

the best of my ability.





A copy of this discharge summary will be sent to the above providers to 

facilitate continuity of care.





Today, I personally spent 45 minutes on his case, of which greater than 50% of 

the time was spent in patient education, counseling, and coordination of care as

described above.








- Physical Exam


General: Alert, In no apparent distress, Oriented x3


HEENT: Atraumatic, PERRLA, Mucous membr. moist/pink, EOMI, Sclerae nonicteric


Neck: Supple, JVD not distended


Respiratory: Clear to auscultation bilaterally, Normal air movement


Cardiovascular: No edema, Regular rate/rhythm, Normal S1 S2, No gallops, No 

rubs, No murmurs


Gastrointestinal: Normal bowel sounds, Soft and benign, Non-distended, No 

tenderness, No rebound, No guarding


Musculoskeletal: No clubbing


Integumentary: No rashes


Neurological: Normal speech, Sensation intact, Cranial nerves 3-12 intact, 

Normal reflexes 2+, Normal affect, Other (5/5 strength in RUE/RLE. 2/5 strength 

in LUE. 3-4/5 strength in LLE.)








NIH Stroke Scale


1a. Level of consciousness: 0 - Alert; keenly responsive  


1b. LOC questions: 0 - Both questions right


1c. LOC commands: 0 - Performs both tasks


2. Best Gaze: 0 - Normal


3. Visual: 0 - No visual loss


4. Facial Palsy: 0 - Normal symmetry


5a. Motor left arm: 2 - some effort against gravity


5b. Motor right arm: 0 - No drift for 10 seconds


6a. Motor left le - drift, but doesn't hit bed 


6b. Motor right le - No drift for 5 seconds


7. Limb ataxia: 0 - No ataxia


8. Sensory: 0 - Normal; no sensory loss


9. Best Language: 0 - Normal; no aphasia


10. Dysarthria: 0 - Normal


11. Extinction and Inattention: 0 - No abnormality 


12. Distal motor function: 0 - No abnormality





Total Score: 3











Vital Signs/Physical Exam: 














Temp Pulse Resp BP Pulse Ox


 


 98.8 F   77   26 H  118/83   95 


 


 09/03/22 12:00  22 09:59  22 08:00  22 09:59  22 08:00








Laboratory Data at Discharge: 














WBC  5.40 K/uL (4.3-10.9)   22  08:07    


 


Hgb  10.3 g/dL (13.6-17.9)  L  22  08:07    


 


Hct  29.6 % (39.6-49.0)  L  22  08:07    


 


Plt Count  52 K/uL (152-406)  L  22  08:07    


 


PT  11.8 SECONDS (9.5-12.5)   22  09:37    


 


INR  1.07   22  09:37    


 


APTT  35.8 SECONDS (24.3-36.9)   22  09:37    


 


Sodium  136 mmol/L (136-145)   22  08:07    


 


Potassium  3.0 mmol/L (3.5-5.1)  L  22  08:07    


 


BUN  26 mg/dL (7-18)  H  22  08:07    


 


Creatinine  1.48 mg/dL (0.55-1.3)  H  22  08:07    


 


Glucose  114 mg/dL ()  H  22  08:07    


 


Magnesium  2.0 mg/dL (1.8-2.4)   22  09:37    


 


Total Bilirubin  0.7 mg/dL (0.2-1.0)   22  08:07    


 


AST  41 U/L (15-37)  H  22  08:07    


 


ALT  33 U/L (12-78)   22  08:07    


 


Alkaline Phosphatase  57 U/L ()   22  08:07    


 


Triglycerides  104 mg/dL (<150)   22  02:34    


 


Cholesterol  136 mg/dL (<200)   22  02:34    


 


HDL Cholesterol  53 mg/dL (40-60)   22  02:34    


 


Cholesterol/HDL Ratio  2.57   22  02:34    








Home Medications: 








Glipizide [Glipizide Xl] 10 mg PO BID 22 


Metformin HCl 1 tab PO BID 22 


Aspirin Chewable [Aspirin Chewable*] 81 mg PO DAILY  tab.chew 22 


Atorvastatin Calcium [Lipitor*] 40 mg PO BEDTIME  tab 22 


Clopidogrel Bisulfate [Plavix*] 75 mg PO DAILY 22 


Folic Acid 1 mg PO LUNCH #30 22 





New Medications: 


Folic Acid 1 mg PO LUNCH #30


Diet: AHA


Activity: [place]


Followup: 


Amena Chavez DO, DO [ACTIVE - CAN ADMIT] - 


Jordi Alvarez MD [OUTSIDE PHYSICIAN] - 


Time spent managing pt's care (in minutes): 45

## 2022-09-30 ENCOUNTER — HOSPITAL ENCOUNTER (EMERGENCY)
Dept: HOSPITAL 97 - ER | Age: 67
Discharge: HOME | End: 2022-09-30
Payer: COMMERCIAL

## 2022-09-30 DIAGNOSIS — M25.512: Primary | ICD-10-CM

## 2022-09-30 DIAGNOSIS — E11.9: ICD-10-CM

## 2022-09-30 PROCEDURE — 99283 EMERGENCY DEPT VISIT LOW MDM: CPT

## 2022-09-30 NOTE — ER
Nurse's Notes                                                                                     

 [facility]                                                                 

Name: Alden Jimenez                                                                                

Age: 67 yrs                                                                                       

Sex: Male                                                                                         

: 1955                                                                                   

MRN: [id number]                                                                                   

Arrival Date: 2022                                                                          

Time: 10:30                                                                                       

Account#: [id number]                                                                            

Bed 18                                                                                            

Private MD:                                                                                       

Diagnosis: Pain in left shoulder                                                                  

                                                                                                  

Presentation:                                                                                     

                                                                                             

10:42 Chief complaint: Patient states: L shoulder pain that began last night. Son states that ss  

      patient recently got out of [facility] after having two strokes. Pt now has L sided          

      residual deficits. Coronavirus screen: Client denies travel out of the [place] in the last     

      14 days. Ebola Screen: Patient denies exposure to infectious person. Patient denies         

      travel to an Ebola-affected area in the 21 days before illness onset. Initial Sepsis        

      Screen: Does the patient meet any 2 criteria? No. Patient's initial sepsis screen is        

      negative. Does the patient have a suspected source of infection? No. Patient's initial      

      sepsis screen is negative. Risk Assessment: Do you want to hurt yourself or someone         

      else? Patient reports no desire to harm self or others. Onset of symptoms was 2022.                                                                                   

10:42 Method Of Arrival: Wheelchair                                                           ss  

10:42 Acuity: JHOAN 3                                                                           ss  

                                                                                                  

Triage Assessment:                                                                                

11:11 General: Appears uncomfortable, Behavior is calm, cooperative. Pain: Complains of pain  ap3 

      in left arm. Neuro: Level of Consciousness is awake, alert, obeys commands, Oriented to     

      person, place, time, situation. Cardiovascular: Patient's skin is warm and dry.             

      Respiratory: Airway is patent Respiratory effort is even, unlabored, Respiratory            

      pattern is regular, symmetrical.                                                            

                                                                                                  

Historical:                                                                                       

- Allergies:                                                                                      

10:44 No Known Allergies;                                                                     ss  

- PMHx:                                                                                           

10:44 diabetes mellitus; CVA; L sided paralysis;                                              ss  

                                                                                                  

- Immunization history:: Client reports receiving the 2nd dose of the Covid vaccine.              

- Social history:: Smoking status: Patient/guardian denies using tobacco, Stopped _               

  months ago 1.                                                                                   

                                                                                                  

                                                                                                  

Screenin:11 Abuse screen: Denies threats or abuse. Nutritional screening: No deficits noted.        ap3 

      Tuberculosis screening: No symptoms or risk factors identified. Fall Risk None              

      identified.                                                                                 

                                                                                                  

Assessment:                                                                                       

12:14 General: Appears in no apparent distress. uncomfortable, Behavior is cooperative,       eh3 

      appropriate for age. Pain: Complains of pain in anterior aspect of left shoulder.           

      Neuro: Level of Consciousness is awake, alert, obeys commands, Oriented to person,          

      place, time, situation. Cardiovascular: Capillary refill < 3 seconds Patient's skin is      

      warm and dry. Respiratory: Airway is patent Respiratory effort is even, unlabored.          

      Musculoskeletal: Circulation, motion, and sensation intact. Range of motion: limited in     

      left arm and anterior aspect of left shoulder.                                              

                                                                                                  

Vital Signs:                                                                                      

10:42  / 64; Pulse 71; Resp 16; Temp 98.6(TE); Pulse Ox 100% on R/A; Weight 74.39 kg;   ss  

      Height 5 ft. 9 in. (175.26 cm); Pain 7/10;                                                  

12:14  / 77; Pulse 75; Resp 18; Pulse Ox 100% on R/A; Pain 5/10;                        eh3 

10:42 Body Mass Index 24.22 (74.39 kg, 175.26 cm)                                               

                                                                                                  

ED Course:                                                                                        

10:30 Patient arrived in ED.                                                                  mr  

10:44 Triage completed.                                                                         

10:44 Arm band placed on right wrist.                                                           

10:46 Rodrigo Potter is PHCP.                                                                  [facility] 

10:46 Solomon Muñoz MD is Attending Physician.                                              9 

11:06 Stephanie Pastor, RN is Primary Nurse.                                                  ap3 

11:11 Patient has correct armband on for positive identification. Bed in low position. Call   ap3 

      light in reach. Side rails up X2. Adult w/ patient. Pulse ox on. NIBP on.                   

12:14 No provider procedures requiring assistance completed. Patient did not have IV access   eh3 

      during this emergency room visit.                                                           

                                                                                                  

Administered Medications:                                                                         

11:11 Drug: traMADol 50 mg Route: PO;                                                         ap3 

12:14 Follow up: Response: Pain is decreased                                                  eh3 

                                                                                                  

                                                                                                  

Medication:                                                                                       

12:14 VIS not applicable for this client.                                                     eh3 

                                                                                                  

Outcome:                                                                                          

12:06 Discharge ordered by MD.                                                                jl9 

12:14 Discharged to home via wheelchair, with family.                                         eh3 

12:14 Condition: stable                                                                           

12:14 Discharge instructions given to patient, Instructed on discharge instructions, follow       

      up and referral plans. no driving heavy equipment, medication usage, Demonstrated           

      understanding of instructions, follow-up care, medications, Prescriptions given X 2.        

12:21 Patient left the ED.                                                                    eh3 

                                                                                                  

Signatures:                                                                                       

Goldie Quiroz Shelby, RN RN                                                      

Prokisch, Stephanie, RN                    RN   ap3                                                  

Marie Apodaca RN                          RN   eh3                                                  

Rodrigo Potter                                jl9                                                  

                                                                                                  

Corrections: (The following items were deleted from the chart)                                    

10:45 10:42  / 64; Pulse 17bpm; Resp 16bpm; Pulse Ox 100% RA; Temp 98.6F Temporal;      ss  

      74.39 kg; Height 5 ft. 9 in.; BMI: 24.2; Pain 7/10; ss                                      

                                                                                                  

**************************************************************************************************

## 2022-09-30 NOTE — RAD REPORT
EXAM DESCRIPTION:  RAD - Shoulder  Left 2 View - 9/30/2022 11:36 am

 

CLINICAL HISTORY:   Left shoulder pain

 

FINDINGS:   No fracture or dislocation is seen.

 

Mild osteoarthritis AC joint

## 2022-09-30 NOTE — EDPHYS
Physician Documentation                                                                           

 [facility]                                                                 

Name: Alden Jimenez                                                                                

Age: 67 yrs                                                                                       

Sex: Male                                                                                         

: 1955                                                                                   

MRN: [id number]                                                                                   

Arrival Date: 2022                                                                          

Time: 10:30                                                                                       

Account#: [id number]                                                                            

Bed 18                                                                                            

Private MD:                                                                                       

ED Physician Solomon Muñoz                                                                       

HPI:                                                                                              

                                                                                             

11:03 This 67 yrs old  Male presents to ER via Wheelchair with complaints of left     jl9 

      shoulder pain since yesterday. No trauma reported. .                                        

11:03 The patient or guardian complains of pain. The complaints affect the anterior aspect of jl9 

      left shoulder. Onset: The symptoms/episode began/occurred yesterday. Treatment prior to     

      arrival includes: over the counter medications. Modifying factors: The symptoms are         

      alleviated by nothing. the symptoms are aggravated by nothing. Associated signs and         

      symptoms: Pertinent negatives: deformity, erythema, swelling. Severity of symptoms: in      

      the emergency department the symptoms a " 4" out of "10".                                   

                                                                                                  

Historical:                                                                                       

- Allergies:                                                                                      

10:44 No Known Allergies;                                                                     ss  

- PMHx:                                                                                           

10:44 diabetes mellitus; CVA; L sided paralysis;                                              ss  

                                                                                                  

- Immunization history:: Client reports receiving the 2nd dose of the Covid vaccine.              

- Social history:: Smoking status: Patient/guardian denies using tobacco, Stopped _               

  months ago 1.                                                                                   

                                                                                                  

                                                                                                  

ROS:                                                                                              

11:04 Constitutional: Negative for fever, chills, and weight loss, Eyes: Negative for injury, jl9 

      pain, redness, and discharge, ENT: Negative for injury, pain, and discharge, Neck:          

      Negative for injury, pain, and swelling, Cardiovascular: Negative for chest pain,           

      palpitations, and edema, Respiratory: Negative for shortness of breath, cough,              

      wheezing, and pleuritic chest pain, Abdomen/GI: Negative for abdominal pain, nausea,        

      vomiting, diarrhea, and constipation, Back: Negative for injury and pain.                   

11:04 : Negative for injury, bleeding, discharge, and swelling, Skin: Negative for injury,      

      rash, and discoloration, Neuro: Negative for headache, weakness, numbness, tingling,        

      and seizure, Psych: Negative for depression, anxiety, suicide ideation, homicidal           

      ideation, and hallucinations, Allergy/Immunology: Negative for hives, rash, and             

      allergies, Endocrine: Negative for neck swelling, polydipsia, polyuria, polyphagia, and     

      marked weight changes, Hematologic/Lymphatic: Negative for swollen nodes, abnormal          

      bleeding, and unusual bruising.                                                             

11:04 MS/extremity: Positive for pain.                                                            

                                                                                                  

Exam:                                                                                             

11:05 Constitutional:  This is a well developed, well nourished patient who is awake, alert,  jl9 

      and in no acute distress. Head/Face:  Normocephalic, atraumatic. Eyes:  Pupils equal        

      round and reactive to light, extra-ocular motions intact.  Lids and lashes normal.          

      Conjunctiva and sclera are non-icteric and not injected.  Cornea within normal limits.      

      Periorbital areas with no swelling, redness, or edema. ENT:   Mucous membranes moist.       

      Neck:  Trachea midline, no thyromegaly or masses palpated, and no cervical                  

      lymphadenopathy.  Supple, full range of motion without nuchal rigidity, or vertebral        

      point tenderness.  No Meningismus. Chest/axilla:  Normal chest wall appearance and          

      motion.  Nontender with no deformity.  No lesions are appreciated. Cardiovascular:          

      Regular rate and rhythm with a normal S1 and S2.  No gallops, murmurs, or rubs.  Normal     

      PMI, no JVD.  No pulse deficits. Respiratory:  Lungs have equal breath sounds               

      bilaterally, clear to auscultation and percussion.  No rales, rhonchi or wheezes noted.     

       No increased work of breathing, no retractions or nasal flaring. Abdomen/GI:  Soft,        

      non-tender, with normal bowel sounds.  No distension or tympany.  No guarding or            

      rebound.  No evidence of tenderness throughout. Back:  No spinal tenderness.  No            

      costovertebral tenderness.  Full range of motion. Skin:  Warm, dry with normal turgor.      

      Normal color with no rashes, no lesions, and no evidence of cellulitis.                     

11:05 Neuro:  Awake and alert, GCS 15, oriented to person, place, time, and situation.            

      Cranial nerves II-XII grossly intact.  Motor strength 5/5 in all extremities.  Sensory      

      grossly intact.  Cerebellar exam normal.  Normal gait. Psych:  Awake, alert, with           

      orientation to person, place and time.  Behavior, mood, and affect are within normal        

      limits.                                                                                     

11:05 Musculoskeletal/extremity: Extremities: grossly normal except: pain, ROM: Left shoulder     

      pain at rest. .                                                                             

                                                                                                  

Vital Signs:                                                                                      

10:42  / 64; Pulse 71; Resp 16; Temp 98.6(TE); Pulse Ox 100% on R/A; Weight 74.39 kg;   ss  

      Height 5 ft. 9 in. (175.26 cm); Pain 7/10;                                                  

12:14  / 77; Pulse 75; Resp 18; Pulse Ox 100% on R/A; Pain 5/10;                        eh3 

10:42 Body Mass Index 24.22 (74.39 kg, 175.26 cm)                                             ss  

                                                                                                  

MDM:                                                                                              

10:46 Patient medically screened.                                                             jl9 

11:05 Data reviewed: vital signs, nurses notes.                                               9 

12:05 Counseling: I had a detailed discussion with the patient and/or guardian regarding: the jl9 

      historical points, exam findings, and any diagnostic results supporting the                 

      discharge/admit diagnosis, radiology results, the need for outpatient follow up, to         

      return to the emergency department if symptoms worsen or persist or if there are any        

      questions or concerns that arise at home.                                                   

                                                                                                  

                                                                                             

10:48 Order name: XRAY Shoulder LEFT 2 view                                                   jl9 

                                                                                             

11:48 Order name: RAD; Complete Time: 12:04                                                   EDMS

                                                                                                  

Administered Medications:                                                                         

11:11 Drug: traMADol 50 mg Route: PO;                                                         ap3 

12:14 Follow up: Response: Pain is decreased                                                  eh3 

                                                                                                  

                                                                                                  

Disposition:                                                                                      

14:25 Co-signature as Attending Physician, Solomon Muñoz MD I agree with the assessment and   kdr 

      plan of care.                                                                               

                                                                                                  

Disposition Summary:                                                                              

22 12:06                                                                                    

Discharge Ordered                                                                                 

      Location: Home                                                                          jl9 

      Condition: Stable                                                                       jl9 

      Diagnosis                                                                                   

        - Pain in left shoulder                                                               jl9 

      Followup:                                                                               jl9 

        - With: Private Physician                                                                  

        - When: 1 - 2 days                                                                         

        - Reason: Recheck today's complaints, Continuance of care, Re-evaluation by your           

      physician                                                                                   

      Discharge Instructions:                                                                     

        - Discharge Summary Sheet                                                             jl9 

        - Shoulder Pain, Easy-to-Read                                                         jl9 

      Forms:                                                                                      

        - Medication Reconciliation Form                                                      jl9 

        - Thank You Letter                                                                    jl9 

        - Antibiotic Education                                                                jl9 

        - Prescription Opioid Use                                                             jl9 

      Prescriptions:                                                                              

        - Tramadol 50 mg Oral Tablet                                                               

            - take 1 tablet by ORAL route every 8 hours as needed; 12 tablet; Refills: 0,     jl9 

      Product Selection Permitted                                                                 

        - Cyclobenzaprine 5 mg Oral Tablet                                                         

            - take 1 tablet by ORAL route 3 times per day As needed; 15 tablet; Refills: 0,   jl9 

      Product Selection Permitted                                                                 

Signatures:                                                                                       

[facility]                           EDMS                                                 

Solomon Muñoz MD MD   [facility]                                                  

Laya Banuelos RN                      RN                                                      

Stephanie Pastor RN                    RN   ap3                                                  

Rodrigo Potter                                jl9                                                  

Marie Apodaca                              RN   eh3                                                  

                                                                                                  

**************************************************************************************************

## 2022-10-01 VITALS — DIASTOLIC BLOOD PRESSURE: 77 MMHG | SYSTOLIC BLOOD PRESSURE: 141 MMHG

## 2022-10-01 VITALS — TEMPERATURE: 98.6 F | OXYGEN SATURATION: 100 %
